# Patient Record
Sex: FEMALE | Race: OTHER | HISPANIC OR LATINO | URBAN - METROPOLITAN AREA
[De-identification: names, ages, dates, MRNs, and addresses within clinical notes are randomized per-mention and may not be internally consistent; named-entity substitution may affect disease eponyms.]

---

## 2022-11-27 ENCOUNTER — EMERGENCY (EMERGENCY)
Facility: HOSPITAL | Age: 52
LOS: 0 days | Discharge: HOME | End: 2022-11-27
Attending: EMERGENCY MEDICINE | Admitting: EMERGENCY MEDICINE

## 2022-11-27 VITALS
HEART RATE: 90 BPM | SYSTOLIC BLOOD PRESSURE: 133 MMHG | DIASTOLIC BLOOD PRESSURE: 93 MMHG | OXYGEN SATURATION: 99 % | RESPIRATION RATE: 18 BRPM | TEMPERATURE: 99 F | WEIGHT: 139.99 LBS

## 2022-11-27 DIAGNOSIS — Z88.5 ALLERGY STATUS TO NARCOTIC AGENT: ICD-10-CM

## 2022-11-27 DIAGNOSIS — M54.42 LUMBAGO WITH SCIATICA, LEFT SIDE: ICD-10-CM

## 2022-11-27 DIAGNOSIS — M54.9 DORSALGIA, UNSPECIFIED: ICD-10-CM
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
## 2022-11-27 DIAGNOSIS — S63.602A UNSPECIFIED SPRAIN OF LEFT THUMB, INITIAL ENCOUNTER: ICD-10-CM

## 2022-11-27 DIAGNOSIS — M25.542 PAIN IN JOINTS OF LEFT HAND: ICD-10-CM

## 2022-11-27 DIAGNOSIS — S09.90XA UNSPECIFIED INJURY OF HEAD, INITIAL ENCOUNTER: ICD-10-CM

## 2022-11-27 DIAGNOSIS — M79.645 PAIN IN LEFT FINGER(S): ICD-10-CM

## 2022-11-27 DIAGNOSIS — S39.012A STRAIN OF MUSCLE, FASCIA AND TENDON OF LOWER BACK, INITIAL ENCOUNTER: ICD-10-CM
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
## 2022-11-27 DIAGNOSIS — C50.919 MALIGNANT NEOPLASM OF UNSPECIFIED SITE OF UNSPECIFIED FEMALE BREAST: ICD-10-CM

## 2022-11-27 DIAGNOSIS — Z88.8 ALLERGY STATUS TO OTHER DRUGS, MEDICAMENTS AND BIOLOGICAL SUBSTANCES: ICD-10-CM

## 2022-11-27 DIAGNOSIS — M54.2 CERVICALGIA: ICD-10-CM

## 2022-11-27 DIAGNOSIS — M54.41 LUMBAGO WITH SCIATICA, RIGHT SIDE: ICD-10-CM

## 2022-11-27 DIAGNOSIS — M54.12 RADICULOPATHY, CERVICAL REGION: ICD-10-CM

## 2022-11-27 DIAGNOSIS — G89.29 OTHER CHRONIC PAIN: ICD-10-CM

## 2022-11-27 PROCEDURE — 72100 X-RAY EXAM L-S SPINE 2/3 VWS: CPT | Mod: 26

## 2022-11-27 PROCEDURE — 99284 EMERGENCY DEPT VISIT MOD MDM: CPT

## 2022-11-27 PROCEDURE — 73130 X-RAY EXAM OF HAND: CPT | Mod: 26,LT

## 2022-11-27 RX ORDER — METHOCARBAMOL 500 MG/1
1000 TABLET, FILM COATED ORAL ONCE
Refills: 0 | Status: COMPLETED | OUTPATIENT
Start: 2022-11-27 | End: 2022-11-27

## 2022-11-27 RX ORDER — IBUPROFEN 200 MG
600 TABLET ORAL ONCE
Refills: 0 | Status: COMPLETED | OUTPATIENT
Start: 2022-11-27 | End: 2022-11-27

## 2022-11-27 RX ADMIN — METHOCARBAMOL 1000 MILLIGRAM(S): 500 TABLET, FILM COATED ORAL at 12:38

## 2022-11-27 RX ADMIN — Medication 600 MILLIGRAM(S): at 12:39
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
## 2022-11-27 NOTE — ED PROVIDER NOTE - ATTENDING APP SHARED VISIT CONTRIBUTION OF CARE
I personally evaluated the patient. I reviewed the Resident’s or Physician Assistant’s note (as assigned above), and agree with the findings and plan except as documented in my note.  52-year-old female history of breast cancer, chronic back and neck pain with bilateral cervical radiculopathy and sciatica (followed by pain management on methadone for pain and ewars TLSO brace), B/L CTS status post slip and fall on a wet floor in a hotel yesterday.  Positive head trauma, no LOC.  Patient complaining of left neck pain and back pain.  Patient reports she is ambulating well but with pain.  No new paresthesias or motor weakness.  No chest pain or shortness of breath.  No abdominal pain. Vitals noted. ALERT OX3 NAD GCS-15. NCAT. PERRL, EOMI. NO MIDLINE C SPINE TENDERNESS. + L PARASPINAL TENDERNESS. LUNGS CLEAR B/L. CHEST NONTENDER, NO CREPITUS. RRR. ABD- SOFT NONTENDER. PELVIS STABLE NONTENDER. BACK WITH THORACIC SPINE TENDERNESS AND B/L LUMBAR PARASPINAL TENDERNESS. NO STEPOFFS. NEURO EXAM NONFOCAL. NORMAL GAIT.

## 2022-11-27 NOTE — ED PROVIDER NOTE - NSFOLLOWUPINSTRUCTIONS_ED_ALL_ED_FT
Sprain    A sprain is a stretch or tear in one of the tough, fiber-like tissues (ligaments) in your body. This is caused by an injury to the area such as a twisting mechanism. Symptoms include pain, swelling, or bruising. Rest that area over the next several days and slowly resume activity when tolerated. Ice can help with swelling and pain.     SEEK IMMEDIATE MEDICAL CARE IF YOU HAVE ANY OF THE FOLLOWING SYMPTOMS: worsening pain, inability to move that body part, numbness or tingling.  Back Pain    Back pain is very common in adults. The cause of back pain is rarely dangerous and the pain often gets better over time. The cause of your back pain may not be known and may include strain of muscles or ligaments, degeneration of the spinal disks, or arthritis. Occasionally the pain may radiate down your leg(s). Over-the-counter medicines to reduce pain and inflammation are often the most helpful. Stretching and remaining active frequently helps the healing process.     SEEK IMMEDIATE MEDICAL CARE IF YOU HAVE ANY OF THE FOLLOWING SYMPTOMS: bowel or bladder control problems, unusual weakness or numbness in your arms or legs, nausea or vomiting, abdominal pain, fever, dizziness/lightheadedness.

## 2022-11-27 NOTE — ED ADULT TRIAGE NOTE - CHIEF COMPLAINT QUOTE
Patient c/o slip and fall on bathroom floor yesterday morning, c/o shoulder and back pain . Patient states she hit her head but did not loss consciousness. Denies AC.

## 2022-11-27 NOTE — ED PROVIDER NOTE - PHYSICAL EXAMINATION
CONST: Well appearing in NAD  EYES: PERRL, EOMI, Sclera and conjunctiva clear.   NECK: Non-tender, no meningeal signs  CARD: Normal S1 S2; Normal rate and rhythm  RESP: Equal BS B/L, No wheezes, rhonchi or rales. No distress  GI: Soft, non-tender, non-distended.  MS: Normal ROM in all extremities. No midline spinal tenderness. paraspinal tenderness upper neck and back and lower back. Left thumb is swollen and tender at base but FROM. NV intact distally  SKIN: Warm, dry, no acute rashes. Good turgor  NEURO: A&Ox3, No focal deficits. Strength 5/5 with no sensory deficits. Steady gait

## 2022-11-27 NOTE — ED PROVIDER NOTE - PATIENT PORTAL LINK FT
You can access the FollowMyHealth Patient Portal offered by [facility] by registering at the following website: [email or web address]. By joining [employer]’s FollowMyHealth portal, you will also be able to view your health information using other applications (apps) compatible with our system.
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
## 2022-11-28 NOTE — ED POST DISCHARGE NOTE - RESULT SUMMARY
LS SPINE XR-AGE INDETERMINATE T-12 AND L-1 COMPRESSION FX. VOICEMAIL IS NOT SAME AS PATIENT NAME. PERSON ON VOICEMAIL IS NOT NAMED AS A CONTACT.  UNABLE TO REACH BY PHONE. [employer] LETTER SENT. LS SPINE XR-AGE INDETERMINATE T-12 AND L-1 COMPRESSION FX. VOICEMAIL IS NOT SAME AS PATIENT NAME. PERSON ON VOICEMAIL IS NOT NAMED AS A CONTACT.  LEFT MESSAGE ON SISTERRANJANA'S PHONES

## 2023-08-28 PROBLEM — Z00.00 ENCOUNTER FOR PREVENTIVE HEALTH EXAMINATION: Status: ACTIVE | Noted: 2023-08-28

## 2023-08-29 ENCOUNTER — APPOINTMENT (OUTPATIENT)
Dept: PAIN MANAGEMENT | Facility: CLINIC | Age: 53
End: 2023-08-29
Payer: COMMERCIAL

## 2023-08-29 VITALS — BODY MASS INDEX: 30.36 KG/M2 | HEIGHT: 62 IN | WEIGHT: 165 LBS

## 2023-08-29 DIAGNOSIS — Z87.898 PERSONAL HISTORY OF OTHER SPECIFIED CONDITIONS: ICD-10-CM

## 2023-08-29 DIAGNOSIS — M54.16 RADICULOPATHY, LUMBAR REGION: ICD-10-CM

## 2023-08-29 DIAGNOSIS — Z85.3 PERSONAL HISTORY OF MALIGNANT NEOPLASM OF BREAST: ICD-10-CM

## 2023-08-29 DIAGNOSIS — F32.A ANXIETY DISORDER, UNSPECIFIED: ICD-10-CM

## 2023-08-29 DIAGNOSIS — M54.12 RADICULOPATHY, CERVICAL REGION: ICD-10-CM

## 2023-08-29 DIAGNOSIS — Z87.09 PERSONAL HISTORY OF OTHER DISEASES OF THE RESPIRATORY SYSTEM: ICD-10-CM

## 2023-08-29 DIAGNOSIS — Z87.39 PERSONAL HISTORY OF OTHER DISEASES OF THE MUSCULOSKELETAL SYSTEM AND CONNECTIVE TISSUE: ICD-10-CM

## 2023-08-29 DIAGNOSIS — Z86.39 PERSONAL HISTORY OF OTHER ENDOCRINE, NUTRITIONAL AND METABOLIC DISEASE: ICD-10-CM

## 2023-08-29 DIAGNOSIS — Z86.79 PERSONAL HISTORY OF OTHER DISEASES OF THE CIRCULATORY SYSTEM: ICD-10-CM

## 2023-08-29 DIAGNOSIS — F41.9 ANXIETY DISORDER, UNSPECIFIED: ICD-10-CM

## 2023-08-29 PROCEDURE — 99204 OFFICE O/P NEW MOD 45 MIN: CPT

## 2023-08-29 RX ORDER — LEVETIRACETAM 1000 MG/1
TABLET, FILM COATED ORAL
Refills: 0 | Status: ACTIVE | COMMUNITY

## 2023-08-29 RX ORDER — LISINOPRIL 30 MG/1
TABLET ORAL
Refills: 0 | Status: ACTIVE | COMMUNITY
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
## 2023-08-29 NOTE — PHYSICAL EXAM
[de-identified] : lumbar spine + seated slump right side + facet loading b/l  cervical spine + spurling's right side pain with extension, no pain with flexion weakness in finger flexors on right side - weeks's b/l [Fever] : no fever [Chills] : chills [Muscle Pain] : muscle pain [Suicidal] : not suicidal [Anxiety] : no anxiety [Depression] : no depression [Negative] : Heme/Lymph [FreeTextEntry3] : eye infection

## 2023-08-29 NOTE — DISCUSSION/SUMMARY
[de-identified] : Treatment options were discussed with the patient. The patient has been having persistent lower back and lumbar radicular pain with minimal improvement with conservative therapies. Given that the patient has severe pain and failed conservative treatment, the patient was given the option to proceed with a lumbar epidural steroid injection to try to get some pain relief.    The risks and benefits were discussed which included bleeding, infection, nerve injury, no pain relief or worse, increased pain. All questions were answered and concerns addressed. [id number] NO MAC  ordered mobic 15mg daily for 2 weeks. Discussed risks and benefits. Avoid taking for any side effects  f/u in 2 weeks s/p inj

## 2023-08-29 NOTE — ED PROVIDER NOTE - IV ALTEPLASE INCLUSION HIDDEN
show Clindamycin Pregnancy And Lactation Text: This medication can be used in pregnancy if certain situations. Clindamycin is also present in breast milk.

## 2024-07-29 ENCOUNTER — INPATIENT (INPATIENT)
Facility: HOSPITAL | Age: 54
LOS: 2 days | Discharge: HOME | DRG: 897 | End: 2024-08-01
Attending: STUDENT IN AN ORGANIZED HEALTH CARE EDUCATION/TRAINING PROGRAM | Admitting: HOSPITALIST
Payer: MEDICARE

## 2024-07-29 ENCOUNTER — EMERGENCY (EMERGENCY)
Facility: HOSPITAL | Age: 54
LOS: 0 days | Discharge: HOME | End: 2024-07-29
Attending: EMERGENCY MEDICINE
Payer: MEDICARE

## 2024-07-29 VITALS
OXYGEN SATURATION: 99 % | HEART RATE: 97 BPM | RESPIRATION RATE: 20 BRPM | SYSTOLIC BLOOD PRESSURE: 142 MMHG | DIASTOLIC BLOOD PRESSURE: 100 MMHG

## 2024-07-29 VITALS
WEIGHT: 199.96 LBS | HEIGHT: 63 IN | RESPIRATION RATE: 16 BRPM | DIASTOLIC BLOOD PRESSURE: 96 MMHG | OXYGEN SATURATION: 98 % | SYSTOLIC BLOOD PRESSURE: 173 MMHG | HEART RATE: 75 BPM | TEMPERATURE: 98 F

## 2024-07-29 VITALS
DIASTOLIC BLOOD PRESSURE: 100 MMHG | HEIGHT: 63 IN | WEIGHT: 199.96 LBS | HEART RATE: 96 BPM | SYSTOLIC BLOOD PRESSURE: 159 MMHG | OXYGEN SATURATION: 98 % | RESPIRATION RATE: 20 BRPM | TEMPERATURE: 98 F

## 2024-07-29 DIAGNOSIS — F11.20 OPIOID DEPENDENCE, UNCOMPLICATED: ICD-10-CM

## 2024-07-29 DIAGNOSIS — Z88.5 ALLERGY STATUS TO NARCOTIC AGENT: ICD-10-CM

## 2024-07-29 DIAGNOSIS — G40.909 EPILEPSY, UNSPECIFIED, NOT INTRACTABLE, WITHOUT STATUS EPILEPTICUS: ICD-10-CM

## 2024-07-29 DIAGNOSIS — I10 ESSENTIAL (PRIMARY) HYPERTENSION: ICD-10-CM

## 2024-07-29 DIAGNOSIS — J34.89 OTHER SPECIFIED DISORDERS OF NOSE AND NASAL SINUSES: ICD-10-CM

## 2024-07-29 DIAGNOSIS — R11.2 NAUSEA WITH VOMITING, UNSPECIFIED: ICD-10-CM

## 2024-07-29 LAB
ALBUMIN SERPL ELPH-MCNC: 4.8 G/DL — SIGNIFICANT CHANGE UP (ref 3.5–5.2)
ALP SERPL-CCNC: 102 U/L — SIGNIFICANT CHANGE UP (ref 30–115)
ALT FLD-CCNC: 26 U/L — SIGNIFICANT CHANGE UP (ref 0–41)
ANION GAP SERPL CALC-SCNC: 15 MMOL/L — HIGH (ref 7–14)
ANISOCYTOSIS BLD QL: SLIGHT — SIGNIFICANT CHANGE UP
AST SERPL-CCNC: 25 U/L — SIGNIFICANT CHANGE UP (ref 0–41)
BASOPHILS # BLD AUTO: 0.05 K/UL — SIGNIFICANT CHANGE UP (ref 0–0.2)
BASOPHILS NFR BLD AUTO: 0.4 % — SIGNIFICANT CHANGE UP (ref 0–1)
BILIRUB SERPL-MCNC: 0.3 MG/DL — SIGNIFICANT CHANGE UP (ref 0.2–1.2)
BUN SERPL-MCNC: 13 MG/DL — SIGNIFICANT CHANGE UP (ref 10–20)
CALCIUM SERPL-MCNC: 9.9 MG/DL — SIGNIFICANT CHANGE UP (ref 8.4–10.5)
CHLORIDE SERPL-SCNC: 96 MMOL/L — LOW (ref 98–110)
CO2 SERPL-SCNC: 26 MMOL/L — SIGNIFICANT CHANGE UP (ref 17–32)
CREAT SERPL-MCNC: 0.8 MG/DL — SIGNIFICANT CHANGE UP (ref 0.7–1.5)
DACRYOCYTES BLD QL SMEAR: SLIGHT — SIGNIFICANT CHANGE UP
EGFR: 88 ML/MIN/1.73M2 — SIGNIFICANT CHANGE UP
EOSINOPHIL # BLD AUTO: 0.01 K/UL — SIGNIFICANT CHANGE UP (ref 0–0.7)
EOSINOPHIL NFR BLD AUTO: 0.1 % — SIGNIFICANT CHANGE UP (ref 0–8)
GLUCOSE SERPL-MCNC: 176 MG/DL — HIGH (ref 70–99)
HCG SERPL QL: NEGATIVE — SIGNIFICANT CHANGE UP
HCT VFR BLD CALC: 40.9 % — SIGNIFICANT CHANGE UP (ref 37–47)
HGB BLD-MCNC: 13.4 G/DL — SIGNIFICANT CHANGE UP (ref 12–16)
IMM GRANULOCYTES NFR BLD AUTO: 0.4 % — HIGH (ref 0.1–0.3)
LYMPHOCYTES # BLD AUTO: 2.31 K/UL — SIGNIFICANT CHANGE UP (ref 1.2–3.4)
LYMPHOCYTES # BLD AUTO: 20.7 % — SIGNIFICANT CHANGE UP (ref 20.5–51.1)
MACROCYTES BLD QL: SLIGHT — SIGNIFICANT CHANGE UP
MANUAL SMEAR VERIFICATION: SIGNIFICANT CHANGE UP
MCHC RBC-ENTMCNC: 21.1 PG — LOW (ref 27–31)
MCHC RBC-ENTMCNC: 32.8 G/DL — SIGNIFICANT CHANGE UP (ref 32–37)
MCV RBC AUTO: 64.4 FL — LOW (ref 81–99)
MICROCYTES BLD QL: SLIGHT — SIGNIFICANT CHANGE UP
MONOCYTES # BLD AUTO: 0.35 K/UL — SIGNIFICANT CHANGE UP (ref 0.1–0.6)
MONOCYTES NFR BLD AUTO: 3.1 % — SIGNIFICANT CHANGE UP (ref 1.7–9.3)
NEUTROPHILS # BLD AUTO: 8.38 K/UL — HIGH (ref 1.4–6.5)
NEUTROPHILS NFR BLD AUTO: 75.3 % — HIGH (ref 42.2–75.2)
NRBC # BLD: 0 /100 WBCS — SIGNIFICANT CHANGE UP (ref 0–0)
OVALOCYTES BLD QL SMEAR: SLIGHT — SIGNIFICANT CHANGE UP
PLAT MORPH BLD: NORMAL — SIGNIFICANT CHANGE UP
PLATELET # BLD AUTO: 238 K/UL — SIGNIFICANT CHANGE UP (ref 130–400)
PLATELET CLUMP BLD QL SMEAR: SIGNIFICANT CHANGE UP
PLATELET COUNT - ESTIMATE: NORMAL — SIGNIFICANT CHANGE UP
PMV BLD: 10.1 FL — SIGNIFICANT CHANGE UP (ref 7.4–10.4)
POTASSIUM SERPL-MCNC: 3.6 MMOL/L — SIGNIFICANT CHANGE UP (ref 3.5–5)
POTASSIUM SERPL-SCNC: 3.6 MMOL/L — SIGNIFICANT CHANGE UP (ref 3.5–5)
PROT SERPL-MCNC: 8.2 G/DL — HIGH (ref 6–8)
RBC # BLD: 6.35 M/UL — HIGH (ref 4.2–5.4)
RBC # FLD: 15.1 % — HIGH (ref 11.5–14.5)
RBC BLD AUTO: ABNORMAL
SODIUM SERPL-SCNC: 137 MMOL/L — SIGNIFICANT CHANGE UP (ref 135–146)
WBC # BLD: 11.14 K/UL — HIGH (ref 4.8–10.8)
WBC # FLD AUTO: 11.14 K/UL — HIGH (ref 4.8–10.8)

## 2024-07-29 PROCEDURE — 85025 COMPLETE CBC W/AUTO DIFF WBC: CPT

## 2024-07-29 PROCEDURE — 80307 DRUG TEST PRSMV CHEM ANLYZR: CPT

## 2024-07-29 PROCEDURE — 99285 EMERGENCY DEPT VISIT HI MDM: CPT

## 2024-07-29 PROCEDURE — 36415 COLL VENOUS BLD VENIPUNCTURE: CPT

## 2024-07-29 PROCEDURE — 80053 COMPREHEN METABOLIC PANEL: CPT

## 2024-07-29 PROCEDURE — 96375 TX/PRO/DX INJ NEW DRUG ADDON: CPT

## 2024-07-29 PROCEDURE — 93010 ELECTROCARDIOGRAM REPORT: CPT

## 2024-07-29 PROCEDURE — 93005 ELECTROCARDIOGRAM TRACING: CPT

## 2024-07-29 PROCEDURE — 99284 EMERGENCY DEPT VISIT MOD MDM: CPT

## 2024-07-29 PROCEDURE — 85027 COMPLETE CBC AUTOMATED: CPT

## 2024-07-29 PROCEDURE — 99053 MED SERV 10PM-8AM 24 HR FAC: CPT

## 2024-07-29 PROCEDURE — 99284 EMERGENCY DEPT VISIT MOD MDM: CPT | Mod: 25

## 2024-07-29 PROCEDURE — 80354 DRUG SCREENING FENTANYL: CPT

## 2024-07-29 PROCEDURE — 84703 CHORIONIC GONADOTROPIN ASSAY: CPT

## 2024-07-29 PROCEDURE — 83735 ASSAY OF MAGNESIUM: CPT

## 2024-07-29 PROCEDURE — 80048 BASIC METABOLIC PNL TOTAL CA: CPT

## 2024-07-29 PROCEDURE — 96374 THER/PROPH/DIAG INJ IV PUSH: CPT

## 2024-07-29 RX ORDER — ONDANSETRON HCL/PF 4 MG/2 ML
4 VIAL (ML) INJECTION EVERY 8 HOURS
Refills: 0 | Status: DISCONTINUED | OUTPATIENT
Start: 2024-07-29 | End: 2024-07-31

## 2024-07-29 RX ORDER — PROCHLORPERAZINE MALEATE 10 MG/1
10 TABLET, FILM COATED ORAL ONCE
Refills: 0 | Status: COMPLETED | OUTPATIENT
Start: 2024-07-29 | End: 2024-07-29

## 2024-07-29 RX ORDER — BUPRENORPHINE HYDROCHLORIDE AND NALOXONE HYDROCHLORIDE 2; .5 MG/1; MG/1
2 TABLET SUBLINGUAL EVERY 4 HOURS
Refills: 0 | Status: DISCONTINUED | OUTPATIENT
Start: 2024-07-29 | End: 2024-08-01

## 2024-07-29 RX ORDER — CLONIDINE 500 UG/ML
0.2 INJECTION, SOLUTION EPIDURAL ONCE
Refills: 0 | Status: COMPLETED | OUTPATIENT
Start: 2024-07-29 | End: 2024-07-29

## 2024-07-29 RX ORDER — BUPRENORPHINE AND NALOXONE 12; 3 MG/1; MG/1
1 FILM BUCCAL; SUBLINGUAL ONCE
Refills: 0 | Status: DISCONTINUED | OUTPATIENT
Start: 2024-07-29 | End: 2024-07-29

## 2024-07-29 RX ORDER — MAGNESIUM, ALUMINUM HYDROXIDE 200-225/5
30 SUSPENSION, ORAL (FINAL DOSE FORM) ORAL EVERY 4 HOURS
Refills: 0 | Status: DISCONTINUED | OUTPATIENT
Start: 2024-07-29 | End: 2024-08-01

## 2024-07-29 RX ORDER — KETOROLAC TROMETHAMINE 30 MG/ML
15 INJECTION, SOLUTION INTRAMUSCULAR ONCE
Refills: 0 | Status: DISCONTINUED | OUTPATIENT
Start: 2024-07-29 | End: 2024-07-29

## 2024-07-29 RX ORDER — RISPERIDONE 1 MG/1
2 TABLET, FILM COATED ORAL DAILY
Refills: 0 | Status: DISCONTINUED | OUTPATIENT
Start: 2024-07-29 | End: 2024-08-01

## 2024-07-29 RX ORDER — ACETAMINOPHEN 500 MG
650 TABLET ORAL EVERY 6 HOURS
Refills: 0 | Status: DISCONTINUED | OUTPATIENT
Start: 2024-07-29 | End: 2024-08-01

## 2024-07-29 RX ORDER — DEXTROSE MONOHYDRATE, SODIUM CHLORIDE, SODIUM LACTATE, CALCIUM CHLORIDE, MAGNESIUM CHLORIDE 1.5; 538; 448; 18.4; 5.08 G/100ML; MG/100ML; MG/100ML; MG/100ML; MG/100ML
1000 SOLUTION INTRAPERITONEAL ONCE
Refills: 0 | Status: COMPLETED | OUTPATIENT
Start: 2024-07-29 | End: 2024-07-29

## 2024-07-29 RX ORDER — VENLAFAXINE 25 MG/1
1 TABLET ORAL
Refills: 0 | DISCHARGE

## 2024-07-29 RX ORDER — RISPERIDONE 1 MG/1
1 TABLET, FILM COATED ORAL
Refills: 0 | DISCHARGE

## 2024-07-29 RX ORDER — BUPRENORPHINE HYDROCHLORIDE AND NALOXONE HYDROCHLORIDE 2; .5 MG/1; MG/1
2 TABLET SUBLINGUAL ONCE
Refills: 0 | Status: DISCONTINUED | OUTPATIENT
Start: 2024-07-29 | End: 2024-07-29

## 2024-07-29 RX ORDER — ONDANSETRON HCL/PF 4 MG/2 ML
4 VIAL (ML) INJECTION ONCE
Refills: 0 | Status: COMPLETED | OUTPATIENT
Start: 2024-07-29 | End: 2024-07-29

## 2024-07-29 RX ORDER — LEVETIRACETAM 1000 MG/1
1 TABLET, FILM COATED ORAL
Refills: 0 | DISCHARGE

## 2024-07-29 RX ORDER — BACTERIOSTATIC SODIUM CHLORIDE 0.9 %
1000 VIAL (ML) INJECTION
Refills: 0 | Status: DISCONTINUED | OUTPATIENT
Start: 2024-07-29 | End: 2024-07-29

## 2024-07-29 RX ORDER — LISINOPRIL/HYDROCHLOROTHIAZIDE 20-12.5 MG
1 TABLET ORAL
Refills: 0 | DISCHARGE

## 2024-07-29 RX ORDER — ONDANSETRON HYDROCHLORIDE 2 MG/ML
8 INJECTION INTRAMUSCULAR; INTRAVENOUS ONCE
Refills: 0 | Status: COMPLETED | OUTPATIENT
Start: 2024-07-29 | End: 2024-07-29

## 2024-07-29 RX ORDER — LORAZEPAM 1 MG/1
2 TABLET ORAL ONCE
Refills: 0 | Status: DISCONTINUED | OUTPATIENT
Start: 2024-07-29 | End: 2024-08-01

## 2024-07-29 RX ORDER — VENLAFAXINE 25 MG/1
75 TABLET ORAL DAILY
Refills: 0 | Status: DISCONTINUED | OUTPATIENT
Start: 2024-07-29 | End: 2024-08-01

## 2024-07-29 RX ORDER — ONDANSETRON HYDROCHLORIDE 2 MG/ML
4 INJECTION INTRAMUSCULAR; INTRAVENOUS ONCE
Refills: 0 | Status: COMPLETED | OUTPATIENT
Start: 2024-07-29 | End: 2024-07-29

## 2024-07-29 RX ORDER — HYDROXYZINE HCL 50 MG/ML
25 VIAL (ML) INTRAMUSCULAR DAILY
Refills: 0 | Status: DISCONTINUED | OUTPATIENT
Start: 2024-07-29 | End: 2024-08-01

## 2024-07-29 RX ORDER — LEVETIRACETAM 1000 MG/1
1000 TABLET, FILM COATED ORAL
Refills: 0 | Status: DISCONTINUED | OUTPATIENT
Start: 2024-07-29 | End: 2024-08-01

## 2024-07-29 RX ORDER — LOPERAMIDE HYDROCHLORIDE 2 MG/1
2 CAPSULE ORAL THREE TIMES A DAY
Refills: 0 | Status: DISCONTINUED | OUTPATIENT
Start: 2024-07-29 | End: 2024-08-01

## 2024-07-29 RX ORDER — LISINOPRIL 10 MG/1
20 TABLET ORAL DAILY
Refills: 0 | Status: DISCONTINUED | OUTPATIENT
Start: 2024-07-29 | End: 2024-08-01

## 2024-07-29 RX ORDER — BUPRENORPHINE AND NALOXONE 12; 3 MG/1; MG/1
2 FILM BUCCAL; SUBLINGUAL ONCE
Refills: 0 | Status: DISCONTINUED | OUTPATIENT
Start: 2024-07-29 | End: 2024-07-29

## 2024-07-29 RX ORDER — DEXTROSE MONOHYDRATE, SODIUM CHLORIDE, SODIUM LACTATE, CALCIUM CHLORIDE, MAGNESIUM CHLORIDE 1.5; 538; 448; 18.4; 5.08 G/100ML; MG/100ML; MG/100ML; MG/100ML; MG/100ML
1000 SOLUTION INTRAPERITONEAL
Refills: 0 | Status: DISCONTINUED | OUTPATIENT
Start: 2024-07-29 | End: 2024-07-30

## 2024-07-29 RX ORDER — DEXTROSE MONOHYDRATE AND SODIUM CHLORIDE 5; .3 G/100ML; G/100ML
1000 INJECTION, SOLUTION INTRAVENOUS ONCE
Refills: 0 | Status: COMPLETED | OUTPATIENT
Start: 2024-07-29 | End: 2024-07-29

## 2024-07-29 RX ORDER — ENOXAPARIN SODIUM 120 MG/.8ML
40 INJECTION SUBCUTANEOUS EVERY 24 HOURS
Refills: 0 | Status: DISCONTINUED | OUTPATIENT
Start: 2024-07-29 | End: 2024-08-01

## 2024-07-29 RX ADMIN — DEXTROSE MONOHYDRATE, SODIUM CHLORIDE, SODIUM LACTATE, CALCIUM CHLORIDE, MAGNESIUM CHLORIDE 1000 MILLILITER(S): 1.5; 538; 448; 18.4; 5.08 SOLUTION INTRAPERITONEAL at 12:55

## 2024-07-29 RX ADMIN — BUPRENORPHINE AND NALOXONE 2 TABLET(S): 12; 3 FILM BUCCAL; SUBLINGUAL at 07:22

## 2024-07-29 RX ADMIN — CLONIDINE 0.2 MILLIGRAM(S): 500 INJECTION, SOLUTION EPIDURAL at 10:44

## 2024-07-29 RX ADMIN — LEVETIRACETAM 1000 MILLIGRAM(S): 1000 TABLET, FILM COATED ORAL at 17:47

## 2024-07-29 RX ADMIN — KETOROLAC TROMETHAMINE 15 MILLIGRAM(S): 30 INJECTION, SOLUTION INTRAMUSCULAR at 06:49

## 2024-07-29 RX ADMIN — ONDANSETRON HYDROCHLORIDE 4 MILLIGRAM(S): 2 INJECTION INTRAMUSCULAR; INTRAVENOUS at 06:49

## 2024-07-29 RX ADMIN — ONDANSETRON HYDROCHLORIDE 8 MILLIGRAM(S): 2 INJECTION INTRAMUSCULAR; INTRAVENOUS at 05:17

## 2024-07-29 RX ADMIN — Medication 25 MILLIGRAM(S): at 23:33

## 2024-07-29 RX ADMIN — PROCHLORPERAZINE MALEATE 10 MILLIGRAM(S): 10 TABLET, FILM COATED ORAL at 18:25

## 2024-07-29 RX ADMIN — BUPRENORPHINE AND NALOXONE 1 TABLET(S): 12; 3 FILM BUCCAL; SUBLINGUAL at 05:27

## 2024-07-29 RX ADMIN — DEXTROSE MONOHYDRATE, SODIUM CHLORIDE, SODIUM LACTATE, CALCIUM CHLORIDE, MAGNESIUM CHLORIDE 75 MILLILITER(S): 1.5; 538; 448; 18.4; 5.08 SOLUTION INTRAPERITONEAL at 17:48

## 2024-07-29 RX ADMIN — DEXTROSE MONOHYDRATE AND SODIUM CHLORIDE 1000 MILLILITER(S): 5; .3 INJECTION, SOLUTION INTRAVENOUS at 06:48

## 2024-07-29 RX ADMIN — Medication 4 MILLIGRAM(S): at 18:27

## 2024-07-29 RX ADMIN — BUPRENORPHINE AND NALOXONE 1 TABLET(S): 12; 3 FILM BUCCAL; SUBLINGUAL at 05:57

## 2024-07-29 RX ADMIN — BUPRENORPHINE HYDROCHLORIDE AND NALOXONE HYDROCHLORIDE 2 TABLET(S): 2; .5 TABLET SUBLINGUAL at 10:44

## 2024-07-29 NOTE — H&P ADULT - NSHPPHYSICALEXAM_GEN_ALL_CORE
Vital Signs Last 24 Hrs  T(C): 36.7 (29 Jul 2024 11:53), Max: 36.8 (29 Jul 2024 07:26)  T(F): 98 (29 Jul 2024 11:53), Max: 98.2 (29 Jul 2024 07:26)  HR: 75 (29 Jul 2024 11:53) (62 - 97)  BP: 150/83 (29 Jul 2024 11:53) (142/100 - 173/96)  RR: 16 (29 Jul 2024 10:48) (16 - 20)  SpO2: 98% (29 Jul 2024 10:48) (98% - 99%)    Parameters below as of 29 Jul 2024 10:48  Patient On (Oxygen Delivery Method): room air      GENERAL:  54y/o Female, anxious, agitated.  HEAD:  Atraumatic, Normocephalic  EYES: EOMI, PERRLA, conjunctiva and sclera clear  MOUTH: + tongue fasiculations   CHEST/LUNG: Clear to auscultation bilaterally; No wheeze, rhonchi, or rales  HEART: Regular rate and rhythm; S1&S2  ABDOMEN: Soft, Nontender, Nondistended x 4 quadrants; Bowel sounds present  EXTREMITIES:   Peripheral Pulses Present, No clubbing, no cyanosis, or no edema, no calf tenderness  PSYCH: AAOx3, cooperative, appropriate  NEUROLOGY: b/l  UE resting tremors, sensation intact   SKIN: WNL

## 2024-07-29 NOTE — H&P ADULT - NSICDXPASTMEDICALHX_GEN_ALL_CORE_FT
PAST MEDICAL HISTORY:  Anxiety and depression     HLD (hyperlipidemia)     HTN (hypertension)     Seizure

## 2024-07-29 NOTE — ED PROVIDER NOTE - NSFOLLOWUPINSTRUCTIONS_ED_ALL_ED_FT
Opioid Use Disorder    Opioid use disorder is a mental disorder. It is the continued nonmedical use of opioids in spite of risks to health and well-being. Misused opioids include the street drug heroin. They also include pain medicines such as morphine, hydrocodone, oxycodone, and fentanyl. Opioids are very addictive. People who misuse opioids get an exaggerated feeling of well-being. Opioid use disorder often disrupts activities at home, work, or school. It may cause mental or physical problems.     A family history of opioid use disorder puts you at higher risk of it. People with opioid use disorder often misuse other drugs or have mental illness such as depression, posttraumatic stress disorder, or antisocial personality disorder. They also are at risk of suicide and death from overdose.    SIGNS AND SYMPTOMS  Signs and symptoms of opioid use disorder include:    Use of opioids in larger amounts or over a longer period than intended.   Unsuccessful attempts to cut down or control opioid use.   A lot of time spent obtaining, using, or recovering from the effects of opioids.   A strong desire or urge to use opioids (craving).   Continued use of opioids in spite of major problems at work, school, or home because of use.   Continued use of opioids in spite of relationship problems because of use.   Giving up or cutting down on important life activities because of opioid use.  Use of opioids over and over in situations when it is physically hazardous, such as driving a car.   Continued use of opioids in spite of a physical problem that is likely related to use. Physical problems can include:   Severe constipation.  Poor nutrition.  Infertility.  Tuberculosis.  Aspiration pneumonia.  Infections such as human immunodeficiency virus (HIV) and hepatitis (from injecting opioids).   Continued use of opioids in spite of a mental problem that is likely related to use. Mental problems can include:  Depression.  Anxiety.  Hallucinations.  Sleep problems.  Loss of sexual function.  Need to use more and more opioids to get the same effect, or lessened effect over time with use of the same amount (tolerance).   Having withdrawal symptoms when opioid use is stopped, or using opioids to reduce or avoid withdrawal symptoms. Withdrawal symptoms include:   Depressed, anxious, or irritable mood.   Nausea, vomiting, diarrhea, or intestinal cramping.   Muscle aches or spasms.   Excessive tearing or runny nose.   Dilated pupils, sweating, or hairs standing on end.  Yawning.  Fever, raised blood pressure, or fast pulse.   Restlessness or trouble sleeping. This does not apply to people taking opioids for medical reasons only.    DIAGNOSIS  Opioid use disorder is diagnosed by your health care provider. You may be asked questions about your opioid use and and how it affects your life. A physical exam may be done. A drug screen may be ordered. You may be referred to a mental health professional. The diagnosis of opioid use disorder requires at least two symptoms within 12 months. The type of opioid use disorder you have depends on the number of signs and symptoms you have. The type may be:    Mild. Two or three signs and symptoms.     Moderate. Four or five signs and symptoms.    Severe. Six or more signs and symptoms.     TREATMENT  Treatment is usually provided by mental health professionals with training in substance use disorders. The following options are available:    Detoxification. This is the first step in treatment for withdrawal. It is medically supervised withdrawal with the use of medicines. These medicines lessen withdrawal symptoms. They also raise the chance of becoming opioid free.   Counseling, also known as talk therapy. Talk therapy addresses the reasons you use opioids. It also addresses ways to keep you from using again (relapse). The goals of talk therapy are to avoid relapse by:   Identifying and avoiding triggers for use.  Finding healthy ways to cope with stress.  Learning how to handle cravings.  Support groups. Support groups provide emotional support, advice, and guidance.  A medicine that blocks opioid receptors in your brain. This medicine can reduce opioid cravings that lead to relapse. This medicine also blocks the desired opioid effect when relapse occurs.  Opioids that are taken by mouth in place of the misused opioid (opioid maintenance treatment). These medicines satisfy cravings but are safer than commonly misused opioids. This often is the best option for people who continue to relapse with other treatments.    HOME CARE INSTRUCTIONS  Take medicines only as directed by your health care provider.   Check with your health care provider before starting new medicines.  Keep all follow-up visits as directed by your health care provider.    SEEK MEDICAL CARE IF:  You are not able to take your medicines as directed.  Your symptoms get worse.    SEEK IMMEDIATE MEDICAL CARE IF:  You have serious thoughts about hurting yourself or others.  You may have taken an overdose of opioids.    FOR MORE INFORMATION  [place] on Drug Abuse: www.drugabuse.gov  Substance Abuse and Mental Health Services Administration: www.samhsa.gov    ADDITIONAL NOTES AND INSTRUCTIONS    Please follow up with your Primary MD in 24-48 hr.  Seek immediate medical care for any new/worsening signs or symptoms.

## 2024-07-29 NOTE — H&P ADULT - NSHPLABSRESULTS_GEN_ALL_CORE
13.4   11.14 )-----------( 238      ( 29 Jul 2024 06:51 )             40.9       07-29    137  |  96<L>  |  13  ----------------------------<  176<H>  3.6   |  26  |  0.8    Ca    9.9      29 Jul 2024 06:51    TPro  8.2<H>  /  Alb  4.8  /  TBili  0.3  /  DBili  x   /  AST  25  /  ALT  26  /  AlkPhos  102  07-29              Urinalysis Basic - ( 29 Jul 2024 06:51 )    Color: x / Appearance: x / SG: x / pH: x  Gluc: 176 mg/dL / Ketone: x  / Bili: x / Urobili: x   Blood: x / Protein: x / Nitrite: x   Leuk Esterase: x / RBC: x / WBC x   Sq Epi: x / Non Sq Epi: x / Bacteria: x

## 2024-07-29 NOTE — ED ADULT NURSE NOTE - NSFALLUNIVINTERV_ED_ALL_ED
Bed/Stretcher in lowest position, wheels locked, appropriate side rails in place/Call bell, personal items and telephone in reach/Instruct patient to call for assistance before getting out of bed/chair/stretcher/Non-slip footwear applied when patient is off stretcher/[place] to call system/Physically safe environment - no spills, clutter or unnecessary equipment/Purposeful proactive rounding/Room/bathroom lighting operational, light cord in reach

## 2024-07-29 NOTE — ED PROVIDER NOTE - ATTENDING APP SHARED VISIT CONTRIBUTION OF CARE
53-year-old female, history of heroin abuse, seizure disorder, states she is trying to get off heroin, complains of vomiting, diarrhea, chills and sweating.  Last heroin use was 4 days ago.  Exam shows alert restless patient in no distress, HEENT NCAT PERRL, rhinorrhea, lungs clear, RR S1S2, abdomen soft NT +BS, no CCE, +fine tremors.

## 2024-07-29 NOTE — H&P ADULT - ASSESSMENT
Patient is a 54yo female w/ pmhx of HTN, HLD, Seizure disorder, CORY/Depression, breast cancer s/p right mastectomy, Opioid abuse presenting w/ f n/v/d, tremors, anxiety, chills being admitted for opioid withdrawal.    #Opiate withdrawal   - COWS score: 16  - s/p Suboxone 8mg/2mg x4, Clonidine .2mg X1 in ED   - IVF LR @ 75ml./hr  - c/w Zofran prn for nausea, AM ecg, Monitor QT interval  - Urine Drug screen  - Addiction Medicine consult     #HTN - c/w home Lisinopril 20mg QD and HCTZ 25mg QD, monitor BP    #HLD - c/w Lipitor 10mg QD    #CORY/Depression: c/w risperidone 3mg QD and Venlafaxine ER 75mg QD    #Seizure disorder - c/w Keppra 1000mg BID, Seizure precaution Ativan 2mg IV Push prn    Diet: DASH  Activity: AAT   VTE PPX: Lovenox   GI PPX: PPI  Dispo: from home, detox, addiction medicine follow up     Plan Discussed and approved by attending on call. Patient is a 52yo female w/ pmhx of HTN, HLD, Seizure disorder, CORY/Depression, breast cancer s/p right mastectomy, Opioid abuse presenting w/ f n/v/d, tremors, anxiety, chills being admitted for opioid withdrawal.    #Opiate withdrawal   - COWS score: 16  - s/p Suboxone 8mg/2mg x4, Clonidine .2mg X1 in ED   - IVF LR @ 75ml./hr  - c/w Suboxone 4mg Q4 prn w/ max dose of 16mg/day    - c/w Zofran prn for nausea, AM ecg, Monitor QT interval  - Urine Drug screen  - Addiction Medicine consult     #HTN - c/w home Lisinopril 20mg QD and HCTZ 25mg QD, monitor BP    #HLD - c/w Lipitor 10mg QD    #CORY/Depression: c/w risperidone 3mg QD and Venlafaxine ER 75mg QD    #Seizure disorder - c/w Keppra 1000mg BID, Seizure precaution Ativan 2mg IV Push prn    Diet: DASH  Activity: AAT   VTE PPX: Lovenox   GI PPX: PPI  Dispo: from home, detox, addiction medicine follow up     Plan Discussed and approved by attending on call. Patient is a 54yo female w/ pmhx of HTN, HLD, Seizure disorder, CORY/Depression, breast cancer s/p right mastectomy, Opioid abuse presenting w/ f n/v/d, tremors, anxiety, chills being admitted for opioid withdrawal.    #Opiate withdrawal   - COWS score: 16  - s/p Suboxone 8mg/2mg x4, Clonidine .2mg X1 in ED   - IVF LR @ 75ml./hr  - c/w Suboxone 4mg Q4 prn w/ max dose of 16mg/day    - c/w Zofran prn for nausea, AM ecg, Monitor QT interval  - Hydroxyzine 25mg QD prn for anxiety   - Loperamide 2mg Q8 prn for diarrhea   - Urine Drug screen  - Addiction Medicine consult     #HTN - c/w home Lisinopril 20mg QD and HCTZ 25mg QD, monitor BP    #HLD - c/w Lipitor 10mg QD    #CORY/Depression: c/w risperidone 3mg QD and Venlafaxine ER 75mg QD    #Seizure disorder - c/w Keppra 1000mg BID, Seizure precaution Ativan 2mg IV Push prn    Diet: DASH  Activity: AAT   VTE PPX: Lovenox   GI PPX: PPI  Dispo: from home, detox, addiction medicine follow up     Plan Discussed and approved by attending on call.

## 2024-07-29 NOTE — ED PROVIDER NOTE - OBJECTIVE STATEMENT
53-year-old female past medical history of opiate dependency, hypertension, seizure disorder presents emergency department for opiate withdrawal.  Patient was in ER earlier went to methadone clinic was too symptomatic to receive methadone so presents back for nausea vomiting diarrhea abdominal cramping joint pain runny nose and anxiety

## 2024-07-29 NOTE — ED PROVIDER NOTE - ATTENDING APP SHARED VISIT CONTRIBUTION OF CARE
Patient with persisting GI symptoms after stopping opiates.  Symptoms are refractory to ED treatment over 2 visits.  Case discussed with toxicology.  Will admit for IV fluid, IV antiemetics, continued treatment with Suboxone versus methadone.

## 2024-07-29 NOTE — H&P ADULT - HISTORY OF PRESENT ILLNESS
Patient is a 54yo female w/ pmhx of HTN, HLD, Seizure disorder, CORY/Depression, breast cancer s/p right mastectomy, Opioid abuse presenting w/ opioid withdrawal sxs. Patient came in this morning w/ complaints of n/v/d, tremors, anxiety, chills, was given 16/4mg of Suboxone and was told to go to the methadone clinic. Reports saw the physician at methadone and was told that her sxs are acute and to return to the ER. Patient reports last using heroin on Thursday night. Reports since has been having chills, joint pain, tremors, n/v/d, anxiety prompting return to the ED. In the ED was given Suboxone 16mg again and .2mg of clonidine. Denies fever, chest pain, palpitation, dizziness, seizures, urinary sxs.

## 2024-07-29 NOTE — PATIENT PROFILE ADULT - FALL HARM RISK - HARM RISK INTERVENTIONS
Assistance with ambulation/Assistance OOB with selected safe patient handling equipment/Communicate Risk of Fall with Harm to all staff/Discuss with provider need for PT consult/Monitor gait and stability/Reinforce activity limits and safety measures with patient and family/Tailored Fall Risk Interventions/Visual Cue: Yellow wristband and red socks/Bed in lowest position, wheels locked, appropriate side rails in place/Call bell, personal items and telephone in reach/Instruct patient to call for assistance before getting out of bed or chair/Non-slip footwear when patient is out of bed/[place] to call system/Physically safe environment - no spills, clutter or unnecessary equipment/Purposeful Proactive Rounding/Room/bathroom lighting operational, light cord in reach

## 2024-07-29 NOTE — ED PROVIDER NOTE - CLINICAL SUMMARY MEDICAL DECISION MAKING FREE TEXT BOX
Patient feels somewhat better after ED treatment.  Patient strongly prefers to be assessed in the methadone clinic as soon as possible.  In my opinion, she is stable for that assessment at this time.

## 2024-07-29 NOTE — ED ADULT TRIAGE NOTE - CHIEF COMPLAINT QUOTE
Patient states she was here earlier for withdrawals and dc'd. Sent back by methadone clinic for w/d management.

## 2024-07-29 NOTE — ED PROVIDER NOTE - PHYSICAL EXAMINATION
CONSTITUTIONAL: Well-appearing;  in no apparent distress.   EYES: PERRL; EOM intact.  Pupils 3 mm bilaterally  ENT: + Rhinorrhea/moist oral mucosa  CARDIOVASCULAR: Normal S1, S2; no murmurs, rubs, or gallops.   RESPIRATORY: Normal chest excursion with respiration; breath sounds clear and equal bilaterally; no wheezes, rhonchi, or rales.  GI/: Normal bowel sounds; non-distended; non-tender; no palpable organomegaly.   MS: No deformity to all extremities  SKIN: Pearly rash.   NEURO/PSYCH: A & O x 4; grossly unremarkable.  Anxious mood

## 2024-07-29 NOTE — ED PROVIDER NOTE - PROGRESS NOTE DETAILS
sxs not improved after taking 16mg suboxone. still not feeling well and vomiting. will add on lab and iv meds and reassess. Signed out to Dr. Ngo. Patient advised follow-up methadone clinic

## 2024-07-29 NOTE — ED PROVIDER NOTE - PATIENT PORTAL LINK FT
You can access the FollowMyHealth Patient Portal offered by [facility] by registering at the following website: [email or web address]. By joining [employer]s FollowMyHealth portal, you will also be able to view your health information using other applications (apps) compatible with our system.

## 2024-07-29 NOTE — ED PROVIDER NOTE - PROGRESS NOTE DETAILS
COWS at 1050 17, Repeated COWS now 14 patient still complaining of symptoms spoke with addiction medicine and inpatient will admit

## 2024-07-29 NOTE — ED PROVIDER NOTE - NSFOLLOWUPCLINICS_GEN_ALL_ED_FT
[facility]  Detox Mgmt  [address] [place] [address]  Phone: [phone]  Fax:   Follow Up Time: 1-3 Days

## 2024-07-29 NOTE — ED PROVIDER NOTE - OBJECTIVE STATEMENT
53 years old female history of hypertension, seizure disorder, opioid abuse presents complaints of opioid withdrawal symptoms.  Admitted last time she used heroin was about 5 days ago.  Reports she has been having nausea, vomiting, loose bowel, feeling anxious, chills, runny nose over the past 5 days.  Not able to tolerate above symptoms anymore so she comes to ED for evaluation.  Otherwise denies recent illness, fever, chills, chest pain, abdominal pain urinary symptoms.

## 2024-07-29 NOTE — H&P ADULT - NS ATTEND AMEND GEN_ALL_CORE FT
Patient is a 54yo female w/ pmhx of HTN, HLD, Seizure disorder, CORY/Depression, breast cancer s/p right mastectomy, Opioid abuse presenting w/ f n/v/d, tremors, anxiety, chills being admitted for opioid withdrawal.  Pt expressed she wanted to quit abusing opioids. Pt currently c/o nausea and vomiting. Discussed with Addiction Medicine team, plan to start suboxone, IV fluids, anti-emetics and supportive medications.    Agree with plan as above.

## 2024-07-29 NOTE — H&P ADULT - NSHPSOCIALHISTORY_GEN_ALL_CORE
Patient reports hx of smoking 1/2 PPD x20 years. Reports heroin use for past 6 months, last dose 5 days ago  Denies ETOH use

## 2024-07-29 NOTE — PATIENT PROFILE ADULT - NSPROPTRIGHTBILLOFRIGHTS_GEN_A_NUR
reoperation, cervical stenosis,  cervical incompetence, intrauterine adhesions; and she agrees to  proceed.         Stephania Kumar MD    D: 02/27/2020 9:55:14       T: 02/27/2020 13:17:03     [email or web address]  Job#: [id number]     Doc#: [id number]    CC: patient

## 2024-07-30 DIAGNOSIS — F11.20 OPIOID DEPENDENCE, UNCOMPLICATED: ICD-10-CM

## 2024-07-30 LAB
ANION GAP SERPL CALC-SCNC: 21 MMOL/L — HIGH (ref 7–14)
BUN SERPL-MCNC: 15 MG/DL — SIGNIFICANT CHANGE UP (ref 10–20)
CALCIUM SERPL-MCNC: 9.5 MG/DL — SIGNIFICANT CHANGE UP (ref 8.4–10.5)
CHLORIDE SERPL-SCNC: 97 MMOL/L — LOW (ref 98–110)
CO2 SERPL-SCNC: 22 MMOL/L — SIGNIFICANT CHANGE UP (ref 17–32)
CREAT SERPL-MCNC: 0.7 MG/DL — SIGNIFICANT CHANGE UP (ref 0.7–1.5)
EGFR: 103 ML/MIN/1.73M2 — SIGNIFICANT CHANGE UP
GLUCOSE SERPL-MCNC: 142 MG/DL — HIGH (ref 70–99)
HCT VFR BLD CALC: 43.4 % — SIGNIFICANT CHANGE UP (ref 37–47)
HGB BLD-MCNC: 13.9 G/DL — SIGNIFICANT CHANGE UP (ref 12–16)
MCHC RBC-ENTMCNC: 20.8 PG — LOW (ref 27–31)
MCHC RBC-ENTMCNC: 32 G/DL — SIGNIFICANT CHANGE UP (ref 32–37)
MCV RBC AUTO: 65 FL — LOW (ref 81–99)
NRBC # BLD: 0 /100 WBCS — SIGNIFICANT CHANGE UP (ref 0–0)
PLATELET # BLD AUTO: 247 K/UL — SIGNIFICANT CHANGE UP (ref 130–400)
PMV BLD: SIGNIFICANT CHANGE UP (ref 7.4–10.4)
POTASSIUM SERPL-MCNC: 3.8 MMOL/L — SIGNIFICANT CHANGE UP (ref 3.5–5)
POTASSIUM SERPL-SCNC: 3.8 MMOL/L — SIGNIFICANT CHANGE UP (ref 3.5–5)
RBC # BLD: 6.68 M/UL — HIGH (ref 4.2–5.4)
RBC # FLD: 17 % — HIGH (ref 11.5–14.5)
SODIUM SERPL-SCNC: 140 MMOL/L — SIGNIFICANT CHANGE UP (ref 135–146)
WBC # BLD: 15.18 K/UL — HIGH (ref 4.8–10.8)
WBC # FLD AUTO: 15.18 K/UL — HIGH (ref 4.8–10.8)

## 2024-07-30 PROCEDURE — 99221 1ST HOSP IP/OBS SF/LOW 40: CPT

## 2024-07-30 PROCEDURE — 99232 SBSQ HOSP IP/OBS MODERATE 35: CPT

## 2024-07-30 RX ORDER — BUPRENORPHINE HYDROCHLORIDE AND NALOXONE HYDROCHLORIDE 2; .5 MG/1; MG/1
1 TABLET SUBLINGUAL
Refills: 0 | Status: DISCONTINUED | OUTPATIENT
Start: 2024-07-30 | End: 2024-08-01

## 2024-07-30 RX ADMIN — Medication 650 MILLIGRAM(S): at 14:04

## 2024-07-30 RX ADMIN — Medication 4 MILLIGRAM(S): at 03:44

## 2024-07-30 RX ADMIN — BUPRENORPHINE HYDROCHLORIDE AND NALOXONE HYDROCHLORIDE 1 TABLET(S): 2; .5 TABLET SUBLINGUAL at 17:06

## 2024-07-30 RX ADMIN — RISPERIDONE 2 MILLIGRAM(S): 1 TABLET, FILM COATED ORAL at 11:46

## 2024-07-30 RX ADMIN — LEVETIRACETAM 1000 MILLIGRAM(S): 1000 TABLET, FILM COATED ORAL at 05:15

## 2024-07-30 RX ADMIN — LEVETIRACETAM 1000 MILLIGRAM(S): 1000 TABLET, FILM COATED ORAL at 17:06

## 2024-07-30 RX ADMIN — LISINOPRIL 20 MILLIGRAM(S): 10 TABLET ORAL at 05:15

## 2024-07-30 RX ADMIN — ENOXAPARIN SODIUM 40 MILLIGRAM(S): 120 INJECTION SUBCUTANEOUS at 05:14

## 2024-07-30 RX ADMIN — VENLAFAXINE 75 MILLIGRAM(S): 25 TABLET ORAL at 11:46

## 2024-07-30 RX ADMIN — Medication 650 MILLIGRAM(S): at 14:17

## 2024-07-30 NOTE — PROGRESS NOTE ADULT - ASSESSMENT
54yo female w/ pmhx of HTN, HLD, Seizure disorder, CORY/Depression, breast cancer s/p right mastectomy, Opioid abuse presenting w/ f n/v/d, tremors, anxiety, chills being admitted for opioid withdrawal.    #Opiate withdrawal   - s/p Suboxone 8mg/2mg x4, Clonidine .2mg X1 in ED, initiating Suboxone per CATCH   - c/w Zofran prn for nausea, AM ecg, Monitor QT interval  - Hydroxyzine 25mg QD prn for anxiety   - Loperamide 2mg Q8 prn for diarrhea   - Urine Drug screen    #HTN - c/w home Lisinopril 20mg QD and HCTZ 25mg QD, monitor BP    #HLD - c/w Lipitor 10mg QD    #CORY/Depression: c/w risperidone 3mg QD and Venlafaxine ER 75mg QD    #Seizure disorder - c/w Keppra 1000mg BID, Seizure precaution Ativan 2mg IV Push prn    Diet: DASH  Activity: AAT   VTE PPX: Lovenox   GI PPX: PPI  Dispo: from home, detox, discharge when stable from opiate withdrawal

## 2024-07-30 NOTE — PROGRESS NOTE ADULT - SUBJECTIVE AND OBJECTIVE BOX
54yo female w/ pmhx of HTN, HLD, Seizure disorder, CORY/Depression, breast cancer s/p right mastectomy, Opioid abuse presenting w/ f n/v/d, tremors, anxiety, chills being admitted for opioid withdrawal.    Today:  Seen at bedside, states she is groggy and feels "bad all over".  I explained that this is part of the withdrawal process and that we would treat her symptoms.        REVIEW OF SYSTEMS:  no new complaints      MEDICATIONS  (STANDING):  buprenorphine 8 mG/naloxone 2 mG SL  Tablet 1 Tablet(s) SubLingual two times a day  enoxaparin Injectable 40 milliGRAM(s) SubCutaneous every 24 hours  hydrochlorothiazide 25 milliGRAM(s) Oral daily  levETIRAcetam 1000 milliGRAM(s) Oral two times a day  lisinopril 20 milliGRAM(s) Oral daily  risperiDONE   Tablet 2 milliGRAM(s) Oral daily  venlafaxine XR. 75 milliGRAM(s) Oral daily    MEDICATIONS  (PRN):  acetaminophen     Tablet .. 650 milliGRAM(s) Oral every 6 hours PRN Temp greater or equal to 38C (100.4F), Mild Pain (1 - 3)  aluminum hydroxide/magnesium hydroxide/simethicone Suspension 30 milliLiter(s) Oral every 4 hours PRN Dyspepsia  buprenorphine 2 mG/naloxone 0.5 mG SL  Tablet 2 Tablet(s) SubLingual every 4 hours PRN withdrawal sxs  hydrOXYzine hydrochloride 25 milliGRAM(s) Oral daily PRN Anxiety  loperamide 2 milliGRAM(s) Oral three times a day PRN Diarrhea  LORazepam   Injectable 2 milliGRAM(s) IV Push once PRN seizure  ondansetron Injectable 4 milliGRAM(s) IV Push every 8 hours PRN Nausea and/or Vomiting      Allergies  tramadol (Unknown)          Vital Signs Last 24 Hrs  T(C): 37.8 (30 Jul 2024 14:10), Max: 37.8 (29 Jul 2024 20:30)  T(F): 100.1 (30 Jul 2024 14:10), Max: 100.1 (29 Jul 2024 20:30)  HR: 110 (30 Jul 2024 14:10) (78 - 110)  BP: 154/102 (30 Jul 2024 14:10) (154/102 - 174/97)  RR: 16 (30 Jul 2024 14:10) (16 - 18)  SpO2: 100% (30 Jul 2024 14:10) (97% - 100%)    Parameters below as of 30 Jul 2024 14:10  Patient On (Oxygen Delivery Method): room air        PHYSICAL EXAM:  GENERAL:  54y/o Female, drowsy  HEAD:  Atraumatic, Normocephalic  EYES: EOMI, PERRLA, conjunctiva and sclera clear  MOUTH: + tongue fasiculations   CHEST/LUNG: Clear to auscultation bilaterally; No wheeze, rhonchi, or rales  HEART: Regular rate and rhythm; S1&S2  ABDOMEN: Soft, Nontender, Nondistended x 4 quadrants; Bowel sounds present  EXTREMITIES:   Peripheral Pulses Present, No clubbing, no cyanosis, or no edema, no calf tenderness  PSYCH: cooperative, appropriate  NEUROLOGY: AAO x 3      LABS:                        13.9   15.18 )-----------( 247      ( 30 Jul 2024 07:00 )             43.4     07-30    140  |  97<L>  |  15  ----------------------------<  142<H>  3.8   |  22  |  0.7    Ca    9.5      30 Jul 2024 07:00    TPro  8.2<H>  /  Alb  4.8  /  TBili  0.3  /  DBili  x   /  AST  25  /  ALT  26  /  AlkPhos  102  07-29      Urinalysis Basic - ( 30 Jul 2024 07:00 )    Color: x / Appearance: x / SG: x / pH: x  Gluc: 142 mg/dL / Ketone: x  / Bili: x / Urobili: x   Blood: x / Protein: x / Nitrite: x   Leuk Esterase: x / RBC: x / WBC x   Sq Epi: x / Non Sq Epi: x / Bacteria: x

## 2024-07-30 NOTE — CONSULT NOTE ADULT - PROBLEM SELECTOR RECOMMENDATION 9
After evaluation at this time will treat with suboxone 16mg daily  and stabilize for possible discharge in 24-48 hrs.  Pts concerns addressed  Pt will be monitored and supportive care provided.  No other changes to medical care plan for withdrawals.  Monitor labs/electrolytes as needed.    -Counseling provided   CATCH team involved for aftercare and pt will follow up with aftercare. to Outpatient.

## 2024-07-30 NOTE — CONSULT NOTE ADULT - SUBJECTIVE AND OBJECTIVE BOX
From ER:  Patient is a 52yo female w/ pmhx of HTN, HLD, Seizure disorder, CORY/Depression, breast cancer s/p right mastectomy, Opioid abuse presenting w/ opioid withdrawal sxs. Patient came in this morning w/ complaints of n/v/d, tremors, anxiety, chills, was given 16/4mg of Suboxone and was told to go to the methadone clinic. Reports saw the physician at methadone and was told that her sxs are acute and to return to the ER. Patient reports last using heroin on Thursday night. Reports since has been having chills, joint pain, tremors, n/v/d, anxiety prompting return to the ED. In the ED was given Suboxone 16mg again and .2mg of clonidine. Denies fever, chest pain, palpitation, dizziness, seizures, urinary sxs.         Pt interviewed, examined and EMR chart reviewed.  Pt admits to using opiates  x    months.   Last use   Hx of withdrawal   variable periods of sobriety in the past.  Has been in detox before _____yes,   _____No    SOCIAL HISTORY:    REVIEW OF SYSTEMS:    Constitutional: No fever, weight loss or fatigue  ENT:  No difficulty hearing, tinnitus, vertigo; No sinus or throat pain  Neck: No pain or stiffness  Respiratory: No cough, wheezing, chills or hemoptysis  Cardiovascular: No chest pain, palpitations, shortness of breath, dizziness or leg swelling  Gastrointestinal: No abdominal or epigastric pain. No nausea, vomiting or hematemesis; No diarrhea or constipation. No melena or hematochezia.  Neurological: No headaches, memory loss, loss of strength, numbness or tremors  Musculoskeletal: No joint pain or swelling; No muscle, back or extremity pain  Psychiatric: No depression, anxiety, mood swings or difficulty sleeping    MEDICATIONS  (STANDING):  enoxaparin Injectable 40 milliGRAM(s) SubCutaneous every 24 hours  hydrochlorothiazide 25 milliGRAM(s) Oral daily  lactated ringers. 1000 milliLiter(s) (75 mL/Hr) IV Continuous <Continuous>  levETIRAcetam 1000 milliGRAM(s) Oral two times a day  lisinopril 20 milliGRAM(s) Oral daily  risperiDONE   Tablet 2 milliGRAM(s) Oral daily  venlafaxine XR. 75 milliGRAM(s) Oral daily    MEDICATIONS  (PRN):  acetaminophen     Tablet .. 650 milliGRAM(s) Oral every 6 hours PRN Temp greater or equal to 38C (100.4F), Mild Pain (1 - 3)  aluminum hydroxide/magnesium hydroxide/simethicone Suspension 30 milliLiter(s) Oral every 4 hours PRN Dyspepsia  buprenorphine 2 mG/naloxone 0.5 mG SL  Tablet 2 Tablet(s) SubLingual every 4 hours PRN withdrawal sxs  hydrOXYzine hydrochloride 25 milliGRAM(s) Oral daily PRN Anxiety  loperamide 2 milliGRAM(s) Oral three times a day PRN Diarrhea  LORazepam   Injectable 2 milliGRAM(s) IV Push once PRN seizure  ondansetron Injectable 4 milliGRAM(s) IV Push every 8 hours PRN Nausea and/or Vomiting      Vital Signs Last 24 Hrs  T(C): 37.4 (30 Jul 2024 05:05), Max: 37.8 (29 Jul 2024 20:30)  T(F): 99.3 (30 Jul 2024 05:05), Max: 100.1 (29 Jul 2024 20:30)  HR: 83 (30 Jul 2024 06:55) (75 - 84)  BP: 167/91 (30 Jul 2024 06:55) (137/78 - 174/97)  BP(mean): --  RR: 18 (30 Jul 2024 06:55) (16 - 18)  SpO2: 97% (29 Jul 2024 20:30) (96% - 98%)    Parameters below as of 29 Jul 2024 14:20  Patient On (Oxygen Delivery Method): room air        PHYSICAL EXAM:    Constitutional: NAD, well-groomed, well-developed  HEENT: PERRLA, EOMI, Normal Hearing,   Neck: No LAD, No JVD  Back: Normal spine flexure, No CVA tenderness  Respiratory: CTAB/L  Cardiovascular: S1 and S2, RRR, no M/G/R  Gastrointestinal: BS+, soft, NT/ND  Extremities: No peripheral edema  Neurological: A/O x 3, no focal deficits    LABS:                        13.9   15.18 )-----------( 247      ( 30 Jul 2024 07:00 )             43.4     07-30    140  |  97<L>  |  15  ----------------------------<  142<H>  3.8   |  22  |  0.7    Ca    9.5      30 Jul 2024 07:00    TPro  8.2<H>  /  Alb  4.8  /  TBili  0.3  /  DBili  x   /  AST  25  /  ALT  26  /  AlkPhos  102  07-29      Urinalysis Basic - ( 30 Jul 2024 07:00 )    Color: x / Appearance: x / SG: x / pH: x  Gluc: 142 mg/dL / Ketone: x  / Bili: x / Urobili: x   Blood: x / Protein: x / Nitrite: x   Leuk Esterase: x / RBC: x / WBC x   Sq Epi: x / Non Sq Epi: x / Bacteria: x      Drug Screen Urine:  Alcohol Level        RADIOLOGY & ADDITIONAL STUDIES:   From ER:  Patient is a 52yo female w/ pmhx of HTN, HLD, Seizure disorder, CORY/Depression, breast cancer s/p right mastectomy, Opioid abuse presenting w/ opioid withdrawal sxs. Patient came in this morning w/ complaints of n/v/d, tremors, anxiety, chills, was given 16/4mg of Suboxone and was told to go to the methadone clinic. Reports saw the physician at methadone and was told that her sxs are acute and to return to the ER. Patient reports last using heroin on Thursday night. Reports since has been having chills, joint pain, tremors, n/v/d, anxiety prompting return to the ED. In the ED was given Suboxone 16mg again and .2mg of clonidine. Denies fever, chest pain, palpitation, dizziness, seizures, urinary sxs.         Pt interviewed, examined and EMR chart reviewed.  Pt admits to using opiates about 1 bundle per day intermittently over the last 4 years. Pt has been using consistently for months at this time. Pt stabilized on suboxone yesterday.   Pt denies any other substance abuse.    Hx of withdrawal   variable periods of sobriety in the past.  Has been in detox before ___X__yes,   _____No    SOCIAL HISTORY:    REVIEW OF SYSTEMS:    Constitutional: No fever, weight loss or fatigue  ENT:  No difficulty hearing, tinnitus, vertigo; No sinus or throat pain  Neck: No pain or stiffness  Respiratory: No cough, wheezing, chills or hemoptysis  Cardiovascular: No chest pain, palpitations, shortness of breath, dizziness or leg swelling  Gastrointestinal: No abdominal or epigastric pain. No nausea, vomiting or hematemesis; No diarrhea or constipation. No melena or hematochezia.  Neurological: No headaches, memory loss, loss of strength, numbness or tremors  Musculoskeletal: No joint pain or swelling; No muscle, back or extremity pain  Psychiatric: No depression, anxiety, mood swings or difficulty sleeping    MEDICATIONS  (STANDING):  enoxaparin Injectable 40 milliGRAM(s) SubCutaneous every 24 hours  hydrochlorothiazide 25 milliGRAM(s) Oral daily  lactated ringers. 1000 milliLiter(s) (75 mL/Hr) IV Continuous <Continuous>  levETIRAcetam 1000 milliGRAM(s) Oral two times a day  lisinopril 20 milliGRAM(s) Oral daily  risperiDONE   Tablet 2 milliGRAM(s) Oral daily  venlafaxine XR. 75 milliGRAM(s) Oral daily    MEDICATIONS  (PRN):  acetaminophen     Tablet .. 650 milliGRAM(s) Oral every 6 hours PRN Temp greater or equal to 38C (100.4F), Mild Pain (1 - 3)  aluminum hydroxide/magnesium hydroxide/simethicone Suspension 30 milliLiter(s) Oral every 4 hours PRN Dyspepsia  buprenorphine 2 mG/naloxone 0.5 mG SL  Tablet 2 Tablet(s) SubLingual every 4 hours PRN withdrawal sxs  hydrOXYzine hydrochloride 25 milliGRAM(s) Oral daily PRN Anxiety  loperamide 2 milliGRAM(s) Oral three times a day PRN Diarrhea  LORazepam   Injectable 2 milliGRAM(s) IV Push once PRN seizure  ondansetron Injectable 4 milliGRAM(s) IV Push every 8 hours PRN Nausea and/or Vomiting      Vital Signs Last 24 Hrs  T(C): 37.4 (30 Jul 2024 05:05), Max: 37.8 (29 Jul 2024 20:30)  T(F): 99.3 (30 Jul 2024 05:05), Max: 100.1 (29 Jul 2024 20:30)  HR: 83 (30 Jul 2024 06:55) (75 - 84)  BP: 167/91 (30 Jul 2024 06:55) (137/78 - 174/97)  BP(mean): --  RR: 18 (30 Jul 2024 06:55) (16 - 18)  SpO2: 97% (29 Jul 2024 20:30) (96% - 98%)    Parameters below as of 29 Jul 2024 14:20  Patient On (Oxygen Delivery Method): room air        PHYSICAL EXAM:    Constitutional: NAD, well-groomed, well-developed  HEENT: PERRLA, EOMI, Normal Hearing,   Neck: No LAD, No JVD  Back: Normal spine flexure, No CVA tenderness  Respiratory: CTAB/L  Cardiovascular: S1 and S2, RRR, no M/G/R  Gastrointestinal: BS+, soft, NT/ND  Extremities: No peripheral edema  Neurological: A/O x 3, no focal deficits    LABS:                        13.9   15.18 )-----------( 247      ( 30 Jul 2024 07:00 )             43.4     07-30    140  |  97<L>  |  15  ----------------------------<  142<H>  3.8   |  22  |  0.7    Ca    9.5      30 Jul 2024 07:00    TPro  8.2<H>  /  Alb  4.8  /  TBili  0.3  /  DBili  x   /  AST  25  /  ALT  26  /  AlkPhos  102  07-29      Urinalysis Basic - ( 30 Jul 2024 07:00 )    Color: x / Appearance: x / SG: x / pH: x  Gluc: 142 mg/dL / Ketone: x  / Bili: x / Urobili: x   Blood: x / Protein: x / Nitrite: x   Leuk Esterase: x / RBC: x / WBC x   Sq Epi: x / Non Sq Epi: x / Bacteria: x      Drug Screen Urine:  Alcohol Level        RADIOLOGY & ADDITIONAL STUDIES:

## 2024-07-31 LAB — PCP SPEC-MCNC: SIGNIFICANT CHANGE UP

## 2024-07-31 PROCEDURE — 99233 SBSQ HOSP IP/OBS HIGH 50: CPT

## 2024-07-31 PROCEDURE — 99231 SBSQ HOSP IP/OBS SF/LOW 25: CPT

## 2024-07-31 RX ORDER — BUPRENORPHINE HYDROCHLORIDE AND NALOXONE HYDROCHLORIDE 2; .5 MG/1; MG/1
2 TABLET SUBLINGUAL ONCE
Refills: 0 | Status: DISCONTINUED | OUTPATIENT
Start: 2024-07-31 | End: 2024-07-31

## 2024-07-31 RX ORDER — PROCHLORPERAZINE MALEATE 10 MG/1
5 TABLET, FILM COATED ORAL EVERY 8 HOURS
Refills: 0 | Status: DISCONTINUED | OUTPATIENT
Start: 2024-07-31 | End: 2024-08-01

## 2024-07-31 RX ORDER — DEXTROSE MONOHYDRATE, SODIUM CHLORIDE, SODIUM LACTATE, CALCIUM CHLORIDE, MAGNESIUM CHLORIDE 1.5; 538; 448; 18.4; 5.08 G/100ML; MG/100ML; MG/100ML; MG/100ML; MG/100ML
1000 SOLUTION INTRAPERITONEAL
Refills: 0 | Status: DISCONTINUED | OUTPATIENT
Start: 2024-07-31 | End: 2024-08-01

## 2024-07-31 RX ORDER — AMLODIPINE BESYLATE 2.5 MG/1
5 TABLET ORAL DAILY
Refills: 0 | Status: DISCONTINUED | OUTPATIENT
Start: 2024-07-31 | End: 2024-08-01

## 2024-07-31 RX ADMIN — LISINOPRIL 20 MILLIGRAM(S): 10 TABLET ORAL at 05:26

## 2024-07-31 RX ADMIN — RISPERIDONE 2 MILLIGRAM(S): 1 TABLET, FILM COATED ORAL at 11:19

## 2024-07-31 RX ADMIN — AMLODIPINE BESYLATE 5 MILLIGRAM(S): 2.5 TABLET ORAL at 12:49

## 2024-07-31 RX ADMIN — Medication 25 MILLIGRAM(S): at 01:49

## 2024-07-31 RX ADMIN — VENLAFAXINE 75 MILLIGRAM(S): 25 TABLET ORAL at 11:19

## 2024-07-31 RX ADMIN — LEVETIRACETAM 1000 MILLIGRAM(S): 1000 TABLET, FILM COATED ORAL at 17:28

## 2024-07-31 RX ADMIN — BUPRENORPHINE HYDROCHLORIDE AND NALOXONE HYDROCHLORIDE 1 TABLET(S): 2; .5 TABLET SUBLINGUAL at 05:26

## 2024-07-31 RX ADMIN — BUPRENORPHINE HYDROCHLORIDE AND NALOXONE HYDROCHLORIDE 1 TABLET(S): 2; .5 TABLET SUBLINGUAL at 17:29

## 2024-07-31 RX ADMIN — Medication 4 MILLIGRAM(S): at 09:57

## 2024-07-31 RX ADMIN — LEVETIRACETAM 1000 MILLIGRAM(S): 1000 TABLET, FILM COATED ORAL at 05:26

## 2024-07-31 RX ADMIN — BUPRENORPHINE HYDROCHLORIDE AND NALOXONE HYDROCHLORIDE 2 TABLET(S): 2; .5 TABLET SUBLINGUAL at 12:49

## 2024-07-31 RX ADMIN — ENOXAPARIN SODIUM 40 MILLIGRAM(S): 120 INJECTION SUBCUTANEOUS at 05:26

## 2024-07-31 RX ADMIN — DEXTROSE MONOHYDRATE, SODIUM CHLORIDE, SODIUM LACTATE, CALCIUM CHLORIDE, MAGNESIUM CHLORIDE 50 MILLILITER(S): 1.5; 538; 448; 18.4; 5.08 SOLUTION INTRAPERITONEAL at 12:49

## 2024-07-31 NOTE — PROGRESS NOTE ADULT - SUBJECTIVE AND OBJECTIVE BOX
Pt interviewed, examined and EMR chart reviewed.    Follow up of Opiate Addiction. Pt is on Suboxone induction. Pt is doing better but still in mild withdrawals Pt with positive nausea and vomiting with muscle aches.       REVIEW OF SYSTEMS:    Constitutional: No fever, weight loss or fatigue  ENT:  No difficulty hearing, tinnitus, vertigo; No sinus or throat pain  Neck: No pain or stiffness  Respiratory: No cough, wheezing, chills or hemoptysis  Cardiovascular: No chest pain, palpitations, shortness of breath, dizziness or leg swelling  Gastrointestinal: No abdominal or epigastric pain. No nausea, vomiting or hematemesis; No diarrhea or constipation. No melena or hematochezia.  Neurological: No headaches, memory loss, loss of strength, numbness or tremors  Musculoskeletal: No joint pain or swelling; No muscle, back or extremity pain      MEDICATIONS  (STANDING):  buprenorphine 8 mG/naloxone 2 mG SL  Tablet 1 Tablet(s) SubLingual two times a day  enoxaparin Injectable 40 milliGRAM(s) SubCutaneous every 24 hours  hydrochlorothiazide 25 milliGRAM(s) Oral daily  levETIRAcetam 1000 milliGRAM(s) Oral two times a day  lisinopril 20 milliGRAM(s) Oral daily  risperiDONE   Tablet 2 milliGRAM(s) Oral daily  venlafaxine XR. 75 milliGRAM(s) Oral daily    MEDICATIONS  (PRN):  acetaminophen     Tablet .. 650 milliGRAM(s) Oral every 6 hours PRN Temp greater or equal to 38C (100.4F), Mild Pain (1 - 3)  aluminum hydroxide/magnesium hydroxide/simethicone Suspension 30 milliLiter(s) Oral every 4 hours PRN Dyspepsia  buprenorphine 2 mG/naloxone 0.5 mG SL  Tablet 2 Tablet(s) SubLingual every 4 hours PRN withdrawal sxs  hydrOXYzine hydrochloride 25 milliGRAM(s) Oral daily PRN Anxiety  loperamide 2 milliGRAM(s) Oral three times a day PRN Diarrhea  LORazepam   Injectable 2 milliGRAM(s) IV Push once PRN seizure  ondansetron Injectable 4 milliGRAM(s) IV Push every 8 hours PRN Nausea and/or Vomiting      Vital Signs Last 24 Hrs  T(C): 36.9 (31 Jul 2024 05:22), Max: 37.8 (30 Jul 2024 14:10)  T(F): 98.4 (31 Jul 2024 05:22), Max: 100.1 (30 Jul 2024 14:10)  HR: 123 (31 Jul 2024 05:22) (90 - 123)  BP: 143/99 (31 Jul 2024 05:22) (143/99 - 163/110)  BP(mean): --  RR: 18 (31 Jul 2024 05:22) (16 - 18)  SpO2: 95% (30 Jul 2024 20:58) (95% - 100%)    Parameters below as of 30 Jul 2024 20:58  Patient On (Oxygen Delivery Method): room air        PHYSICAL EXAM:    Constitutional: NAD, well-groomed, well-developed  HEENT: PERRLA, EOMI, Normal Hearing,   Neck: No LAD, No JVD  Back: Normal spine flexure, No CVA tenderness  Respiratory: CTAB/L  Cardiovascular: S1 and S2, RRR, no M/G/R  Gastrointestinal: BS+, soft, NT/ND  Extremities: No peripheral edema  Neurological: A/O x 3, no focal deficits    LABS:                        13.9   15.18 )-----------( 247      ( 30 Jul 2024 07:00 )             43.4     07-30    140  |  97<L>  |  15  ----------------------------<  142<H>  3.8   |  22  |  0.7    Ca    9.5      30 Jul 2024 07:00        Urinalysis Basic - ( 30 Jul 2024 07:00 )    Color: x / Appearance: x / SG: x / pH: x  Gluc: 142 mg/dL / Ketone: x  / Bili: x / Urobili: x   Blood: x / Protein: x / Nitrite: x   Leuk Esterase: x / RBC: x / WBC x   Sq Epi: x / Non Sq Epi: x / Bacteria: x      Drug Screen Urine:  Alcohol Level        RADIOLOGY & ADDITIONAL STUDIES:

## 2024-07-31 NOTE — PROGRESS NOTE ADULT - SUBJECTIVE AND OBJECTIVE BOX
SUBJECTIVE:    Patient is a 53y old Female who presents with a chief complaint of   Currently admitted to medicine with the primary diagnosis of Opiate addiction       Today is hospital day 2d. This morning she is resting comfortably in bed and reports no new issues or overnight events.     reports nausea and decreased appetite along with tearing in the eyes and sniffles     ROS:   CONSTITUTIONAL: No weakness, fevers or chills   EYES/ENT: No visual changes; No vertigo or throat pain   NECK: No pain or stiffness   RESPIRATORY: No cough, wheezing, hemoptysis; No shortness of breath   CARDIOVASCULAR: No chest pain or palpitations   GASTROINTESTINAL: No abdominal or epigastric pain. No nausea, vomiting, or hematemesis; No diarrhea or constipation. No melena or hematochezia.  GENITOURINARY: No dysuria, frequency or hematuria  NEUROLOGICAL: No numbness or weakness        PAST MEDICAL & SURGICAL HISTORY  HTN (hypertension)    Seizure    HLD (hyperlipidemia)    Anxiety and depression      SOCIAL HISTORY:    ALLERGIES:  tramadol (Unknown)    MEDICATIONS:  STANDING MEDICATIONS  buprenorphine 8 mG/naloxone 2 mG SL  Tablet 1 Tablet(s) SubLingual two times a day  enoxaparin Injectable 40 milliGRAM(s) SubCutaneous every 24 hours  hydrochlorothiazide 25 milliGRAM(s) Oral daily  lactated ringers. 1000 milliLiter(s) IV Continuous <Continuous>  levETIRAcetam 1000 milliGRAM(s) Oral two times a day  lisinopril 20 milliGRAM(s) Oral daily  risperiDONE   Tablet 2 milliGRAM(s) Oral daily  venlafaxine XR. 75 milliGRAM(s) Oral daily    PRN MEDICATIONS  acetaminophen     Tablet .. 650 milliGRAM(s) Oral every 6 hours PRN  aluminum hydroxide/magnesium hydroxide/simethicone Suspension 30 milliLiter(s) Oral every 4 hours PRN  buprenorphine 2 mG/naloxone 0.5 mG SL  Tablet 2 Tablet(s) SubLingual every 4 hours PRN  hydrOXYzine hydrochloride 25 milliGRAM(s) Oral daily PRN  loperamide 2 milliGRAM(s) Oral three times a day PRN  LORazepam   Injectable 2 milliGRAM(s) IV Push once PRN  ondansetron Injectable 4 milliGRAM(s) IV Push every 8 hours PRN    VITALS:   T(F): 98.4  HR: 123  BP: 143/99  RR: 18  SpO2: 95%    LABS:  Negative for smoking/alcohol/drug use.                         13.9   15.18 )-----------( 247      ( 30 Jul 2024 07:00 )             43.4     07-30    140  |  97<L>  |  15  ----------------------------<  142<H>  3.8   |  22  |  0.7    Ca    9.5      30 Jul 2024 07:00        Urinalysis Basic - ( 30 Jul 2024 07:00 )    Color: x / Appearance: x / SG: x / pH: x  Gluc: 142 mg/dL / Ketone: x  / Bili: x / Urobili: x   Blood: x / Protein: x / Nitrite: x   Leuk Esterase: x / RBC: x / WBC x   Sq Epi: x / Non Sq Epi: x / Bacteria: x      RADIOLOGY:    PHYSICAL EXAM:  GEN: No acute distress  HEENT: normocephalic, atraumatic, aniceteric  LUNGS: bl breath sounds   HEART: S1/S2 present. RRR, no murmurs  ABD: Soft, non-tender, non-distended. Bowel sounds present  EXT: no edema   NEURO: AAOX3, normal affect      ASSESSMENT AND PLAN:    52yo female w/ pmhx of HTN, HLD, Seizure disorder, CORY/Depression, breast cancer s/p right mastectomy, Opioid abuse presenting w/ f n/v/d, tremors, anxiety, chills being admitted for opioid withdrawal.    #Opiate withdrawal   - s/p Suboxone 8mg/2mg x4, Clonidine .2mg X1 in ED  - changed zofran to compazine given no improvement in symptoms (reglan not advised due to cross react w/ risperdal)   - Hydroxyzine 25mg QD prn for anxiety   - Loperamide 2mg Q8 prn for diarrhea   - Addiction eval appreciated -  initiated  Suboxone - dosing per CATCH team   - Urine Drug screen     #Uncontrolled blood pressure   #H/O HTN   - on Lisinopril 20mg QD and HCTZ 25mg QD at home   - added amlodipine 5 mg qd     #H/O HLD - c/w Lipitor 10mg QD    #H/O CORY/Depression: c/w risperidone 3mg QD and Venlafaxine ER 75mg QD    #H/O Seizure disorder - c/w Keppra 1000mg BID, Seizure precaution Ativan 2mg IV Push prn    dvt/ gi ppx/diet  dispo: acute - possible 24 hrs   handoff: improvement in withdrawal symptoms

## 2024-08-01 ENCOUNTER — TRANSCRIPTION ENCOUNTER (OUTPATIENT)
Age: 54
End: 2024-08-01

## 2024-08-01 VITALS
DIASTOLIC BLOOD PRESSURE: 65 MMHG | RESPIRATION RATE: 18 BRPM | SYSTOLIC BLOOD PRESSURE: 92 MMHG | HEART RATE: 109 BPM | TEMPERATURE: 97 F

## 2024-08-01 DIAGNOSIS — E87.6 HYPOKALEMIA: ICD-10-CM

## 2024-08-01 LAB
ANION GAP SERPL CALC-SCNC: 14 MMOL/L — SIGNIFICANT CHANGE UP (ref 7–14)
ANION GAP SERPL CALC-SCNC: 16 MMOL/L — HIGH (ref 7–14)
BASOPHILS # BLD AUTO: 0.05 K/UL — SIGNIFICANT CHANGE UP (ref 0–0.2)
BASOPHILS NFR BLD AUTO: 0.4 % — SIGNIFICANT CHANGE UP (ref 0–1)
BUN SERPL-MCNC: 18 MG/DL — SIGNIFICANT CHANGE UP (ref 10–20)
BUN SERPL-MCNC: 22 MG/DL — HIGH (ref 10–20)
CALCIUM SERPL-MCNC: 9.3 MG/DL — SIGNIFICANT CHANGE UP (ref 8.4–10.5)
CALCIUM SERPL-MCNC: 9.8 MG/DL — SIGNIFICANT CHANGE UP (ref 8.4–10.5)
CHLORIDE SERPL-SCNC: 92 MMOL/L — LOW (ref 98–110)
CHLORIDE SERPL-SCNC: 94 MMOL/L — LOW (ref 98–110)
CO2 SERPL-SCNC: 28 MMOL/L — SIGNIFICANT CHANGE UP (ref 17–32)
CO2 SERPL-SCNC: 29 MMOL/L — SIGNIFICANT CHANGE UP (ref 17–32)
CREAT SERPL-MCNC: 0.8 MG/DL — SIGNIFICANT CHANGE UP (ref 0.7–1.5)
CREAT SERPL-MCNC: 0.9 MG/DL — SIGNIFICANT CHANGE UP (ref 0.7–1.5)
EGFR: 76 ML/MIN/1.73M2 — SIGNIFICANT CHANGE UP
EGFR: 88 ML/MIN/1.73M2 — SIGNIFICANT CHANGE UP
EOSINOPHIL # BLD AUTO: 0.01 K/UL — SIGNIFICANT CHANGE UP (ref 0–0.7)
EOSINOPHIL NFR BLD AUTO: 0.1 % — SIGNIFICANT CHANGE UP (ref 0–8)
GLUCOSE SERPL-MCNC: 126 MG/DL — HIGH (ref 70–99)
GLUCOSE SERPL-MCNC: 165 MG/DL — HIGH (ref 70–99)
HCT VFR BLD CALC: 46.3 % — SIGNIFICANT CHANGE UP (ref 37–47)
HGB BLD-MCNC: 14.8 G/DL — SIGNIFICANT CHANGE UP (ref 12–16)
IMM GRANULOCYTES NFR BLD AUTO: 0.4 % — HIGH (ref 0.1–0.3)
LYMPHOCYTES # BLD AUTO: 33.7 % — SIGNIFICANT CHANGE UP (ref 20.5–51.1)
LYMPHOCYTES # BLD AUTO: 4.41 K/UL — HIGH (ref 1.2–3.4)
MAGNESIUM SERPL-MCNC: 2.1 MG/DL — SIGNIFICANT CHANGE UP (ref 1.8–2.4)
MCHC RBC-ENTMCNC: 20.9 PG — LOW (ref 27–31)
MCHC RBC-ENTMCNC: 32 G/DL — SIGNIFICANT CHANGE UP (ref 32–37)
MCV RBC AUTO: 65.3 FL — LOW (ref 81–99)
MONOCYTES # BLD AUTO: 0.81 K/UL — HIGH (ref 0.1–0.6)
MONOCYTES NFR BLD AUTO: 6.2 % — SIGNIFICANT CHANGE UP (ref 1.7–9.3)
NEUTROPHILS # BLD AUTO: 7.77 K/UL — HIGH (ref 1.4–6.5)
NEUTROPHILS NFR BLD AUTO: 59.2 % — SIGNIFICANT CHANGE UP (ref 42.2–75.2)
NRBC # BLD: 0 /100 WBCS — SIGNIFICANT CHANGE UP (ref 0–0)
PLATELET # BLD AUTO: 227 K/UL — SIGNIFICANT CHANGE UP (ref 130–400)
PMV BLD: 10 FL — SIGNIFICANT CHANGE UP (ref 7.4–10.4)
POTASSIUM SERPL-MCNC: 3 MMOL/L — LOW (ref 3.5–5)
POTASSIUM SERPL-MCNC: 3.4 MMOL/L — LOW (ref 3.5–5)
POTASSIUM SERPL-SCNC: 3 MMOL/L — LOW (ref 3.5–5)
POTASSIUM SERPL-SCNC: 3.4 MMOL/L — LOW (ref 3.5–5)
RBC # BLD: 7.09 M/UL — HIGH (ref 4.2–5.4)
RBC # FLD: 17 % — HIGH (ref 11.5–14.5)
SODIUM SERPL-SCNC: 135 MMOL/L — SIGNIFICANT CHANGE UP (ref 135–146)
SODIUM SERPL-SCNC: 138 MMOL/L — SIGNIFICANT CHANGE UP (ref 135–146)
WBC # BLD: 13.1 K/UL — HIGH (ref 4.8–10.8)
WBC # FLD AUTO: 13.1 K/UL — HIGH (ref 4.8–10.8)

## 2024-08-01 PROCEDURE — 99222 1ST HOSP IP/OBS MODERATE 55: CPT

## 2024-08-01 PROCEDURE — 99239 HOSP IP/OBS DSCHRG MGMT >30: CPT

## 2024-08-01 RX ORDER — ONDANSETRON HCL/PF 4 MG/2 ML
1 VIAL (ML) INJECTION
Qty: 21 | Refills: 0
Start: 2024-08-01 | End: 2024-08-07

## 2024-08-01 RX ORDER — POTASSIUM CHLORIDE 1500 MG/1
20 TABLET, EXTENDED RELEASE ORAL
Refills: 0 | Status: COMPLETED | OUTPATIENT
Start: 2024-08-01 | End: 2024-08-01

## 2024-08-01 RX ORDER — AMLODIPINE BESYLATE 2.5 MG/1
1 TABLET ORAL
Qty: 30 | Refills: 0
Start: 2024-08-01 | End: 2024-08-30

## 2024-08-01 RX ORDER — BUPRENORPHINE HYDROCHLORIDE AND NALOXONE HYDROCHLORIDE 2; .5 MG/1; MG/1
1 TABLET SUBLINGUAL THREE TIMES A DAY
Refills: 0 | Status: DISCONTINUED | OUTPATIENT
Start: 2024-08-01 | End: 2024-08-01

## 2024-08-01 RX ORDER — BUPRENORPHINE HYDROCHLORIDE AND NALOXONE HYDROCHLORIDE 2; .5 MG/1; MG/1
1 TABLET SUBLINGUAL
Qty: 1 | Refills: 0
Start: 2024-08-01 | End: 2024-08-07

## 2024-08-01 RX ADMIN — RISPERIDONE 2 MILLIGRAM(S): 1 TABLET, FILM COATED ORAL at 12:00

## 2024-08-01 RX ADMIN — BUPRENORPHINE HYDROCHLORIDE AND NALOXONE HYDROCHLORIDE 1 TABLET(S): 2; .5 TABLET SUBLINGUAL at 14:09

## 2024-08-01 RX ADMIN — ENOXAPARIN SODIUM 40 MILLIGRAM(S): 120 INJECTION SUBCUTANEOUS at 05:26

## 2024-08-01 RX ADMIN — AMLODIPINE BESYLATE 5 MILLIGRAM(S): 2.5 TABLET ORAL at 05:23

## 2024-08-01 RX ADMIN — BUPRENORPHINE HYDROCHLORIDE AND NALOXONE HYDROCHLORIDE 1 TABLET(S): 2; .5 TABLET SUBLINGUAL at 05:22

## 2024-08-01 RX ADMIN — VENLAFAXINE 75 MILLIGRAM(S): 25 TABLET ORAL at 11:37

## 2024-08-01 RX ADMIN — LISINOPRIL 20 MILLIGRAM(S): 10 TABLET ORAL at 05:23

## 2024-08-01 RX ADMIN — POTASSIUM CHLORIDE 20 MILLIEQUIVALENT(S): 1500 TABLET, EXTENDED RELEASE ORAL at 11:38

## 2024-08-01 RX ADMIN — POTASSIUM CHLORIDE 20 MILLIEQUIVALENT(S): 1500 TABLET, EXTENDED RELEASE ORAL at 13:02

## 2024-08-01 RX ADMIN — LEVETIRACETAM 1000 MILLIGRAM(S): 1000 TABLET, FILM COATED ORAL at 05:23

## 2024-08-01 NOTE — PROGRESS NOTE ADULT - SUBJECTIVE AND OBJECTIVE BOX
Patient seen and examined at bedside this morning. Patient denies any nausea or vomiting and states she is feeling much better. No acute events overnight.     Vital Signs:  T(C): 37.3 (01 Aug 2024 05:17), Max: 37.7 (31 Jul 2024 20:30)  T(F): 99.1 (01 Aug 2024 05:17), Max: 99.9 (31 Jul 2024 20:30)  HR: 87 (31 Jul 2024 20:30) (87 - 126)  BP: 143/97 (01 Aug 2024 05:17) (97/77 - 148/83)  RR: 18 (01 Aug 2024 05:17) (18 - 18)  SpO2: 100% (01 Aug 2024 05:17) (96% - 100%)    Physical exam:  GENERAL: NAD, sitting on bed comfortably eating breakfast  HEAD:  Atraumatic, Normocephalic  EYES: EOMI, PERRLA, conjunctiva and sclera clear  ENT: Moist mucous membranes, no tongue fasciculations  NECK: Supple, No JVD  CHEST/LUNG: Clear to auscultation bilaterally with unlabored respirations  HEART: Regular rate and rhythm; no murmurs, rubs, or gallops  ABDOMEN: Bowel sounds present; Soft, Nontender, and Nondistended. No hepatomegaly or splenomegaly.   EXTREMITIES:  2+ Peripheral Pulses with no clubbing, cyanosis, or edema  NERVOUS SYSTEM:  Alert & Oriented X3, speech clear.   MSK: FROM all 4 extremities, full and equal strength  SKIN: No rashes or lesions      Labs and results:                       14.8   13.10 )-----------( 227      ( 01 Aug 2024 06:28 )             46.3       08-01    138  |  94<L>  |  18  ----------------------------<  165<H>  3.0<L>   |  28  |  0.8    Ca    9.3      01 Aug 2024 06:28  Mg     2.1     08-01    Magnesium: 2.1 mg/dL (08-01-24 @ 06:28)      Urinalysis Basic - ( 01 Aug 2024 06:28 )    Color: x / Appearance: x / SG: x / pH: x  Gluc: 165 mg/dL / Ketone: x  / Bili: x / Urobili: x   Blood: x / Protein: x / Nitrite: x   Leuk Esterase: x / RBC: x / WBC x   Sq Epi: x / Non Sq Epi: x / Bacteria: x               Patient seen and examined at bedside this morning. Patient denies any nausea or vomiting and states she is feeling much better. No acute events overnight.     Review of Systems:  CONSTITUTIONAL: No weakness, fevers or chills  EYES/ENT: No visual changes or vertigo   NECK: No pain or stiffness  RESPIRATORY: No shortness of breath, No cough, wheezing, hemoptysis  CARDIOVASCULAR: No chest pain or palpitations  GASTROINTESTINAL: No abdominal or epigastric pain. No nausea, vomiting, or hematemesis; No diarrhea or constipation.   GENITOURINARY: No dysuria, frequency or hematuria  NEUROLOGICAL: No numbness or weakness  SKIN: No itching, burning, rashes, or lesions   All other review of systems is negative unless indicated above.    Past Medical History:  HTN (hypertension)  Seizure  HLD (hyperlipidemia)  Anxiety and depression    Surgical history:  None    Allergies:  tramadol (Unknown)    Medications:  STANDING MEDICATIONS  buprenorphine 8 mG/naloxone 2 mG SL  Tablet 1 Tablet(s) SubLingual two times a day  enoxaparin Injectable 40 milliGRAM(s) SubCutaneous every 24 hours  hydrochlorothiazide 25 milliGRAM(s) Oral daily  lactated ringers. 1000 milliLiter(s) IV Continuous <Continuous>  levETIRAcetam 1000 milliGRAM(s) Oral two times a day  lisinopril 20 milliGRAM(s) Oral daily  risperiDONE   Tablet 2 milliGRAM(s) Oral daily  venlafaxine XR. 75 milliGRAM(s) Oral daily    PRN MEDICATIONS  acetaminophen     Tablet .. 650 milliGRAM(s) Oral every 6 hours PRN  aluminum hydroxide/magnesium hydroxide/simethicone Suspension 30 milliLiter(s) Oral every 4 hours PRN  buprenorphine 2 mG/naloxone 0.5 mG SL  Tablet 2 Tablet(s) SubLingual every 4 hours PRN  hydrOXYzine hydrochloride 25 milliGRAM(s) Oral daily PRN  loperamide 2 milliGRAM(s) Oral three times a day PRN  LORazepam   Injectable 2 milliGRAM(s) IV Push once PRN  ondansetron Injectable 4 milliGRAM(s) IV Push every 8 hours PRN    Vital Signs:  T(F): 99.1, Max: 99.9   HR: 87   BP: 143/97   RR: 18   SpO2: 100%    Physical exam:  GENERAL: NAD, sitting on bed comfortably eating breakfast  HEAD:  Atraumatic, Normocephalic  EYES: EOMI, PERRLA, conjunctiva and sclera clear  ENT: Moist mucous membranes, no tongue fasciculations  NECK: Supple, No JVD  CHEST/LUNG: Clear to auscultation bilaterally with unlabored respirations  HEART: Regular rate and rhythm; no murmurs, rubs, or gallops  ABDOMEN: Bowel sounds present; Soft, Nontender, and Nondistended. No hepatomegaly or splenomegaly.   EXTREMITIES:  2+ Peripheral Pulses with no clubbing, cyanosis, or edema  NERVOUS SYSTEM:  Alert & Oriented X3, speech clear.   MSK: FROM all 4 extremities, full and equal strength  SKIN: No rashes or lesions    Labs and results:                       14.8   13.10 )-----------( 227      ( 01 Aug 2024 06:28 )             46.3       08-01    138  |  94<L>  |  18  ----------------------------<  165<H>  3.0<L>   |  28  |  0.8    Ca    9.3      01 Aug 2024 06:28  Mg     2.1     08-01    Magnesium: 2.1 mg/dL (08-01-24 @ 06:28)      Urinalysis Basic - ( 01 Aug 2024 06:28 )    Color: x / Appearance: x / SG: x / pH: x  Gluc: 165 mg/dL / Ketone: x  / Bili: x / Urobili: x   Blood: x / Protein: x / Nitrite: x   Leuk Esterase: x / RBC: x / WBC x   Sq Epi: x / Non Sq Epi: x / Bacteria: x               Patient seen and examined at bedside this morning. Patient denies any nausea or vomiting and reports she is feeling much better. Patient also denies chest pain, shortness of breath, fevers, chills, abdominal pain, diarrhea, constipation, and weakness. Her nausea improved after she was given Compazine. Patient has been tolerating regular diet since last night. No acute events overnight.     Past Medical History:  HTN (hypertension)  Seizure  HLD (hyperlipidemia)  Anxiety and depression    Surgical history:  None    Allergies:  tramadol (reaction unknown)    Medications:  STANDING MEDICATIONS  buprenorphine 8 mG/naloxone 2 mG SL  Tablet 1 Tablet(s) SubLingual two times a day  enoxaparin Injectable 40 milliGRAM(s) SubCutaneous every 24 hours  hydrochlorothiazide 25 milliGRAM(s) Oral daily  lactated ringers. 1000 milliLiter(s) IV Continuous <Continuous>  levETIRAcetam 1000 milliGRAM(s) Oral two times a day  lisinopril 20 milliGRAM(s) Oral daily  risperiDONE   Tablet 2 milliGRAM(s) Oral daily  venlafaxine XR. 75 milliGRAM(s) Oral daily    PRN MEDICATIONS  acetaminophen     Tablet .. 650 milliGRAM(s) Oral every 6 hours PRN  aluminum hydroxide/magnesium hydroxide/simethicone Suspension 30 milliLiter(s) Oral every 4 hours PRN  buprenorphine 2 mG/naloxone 0.5 mG SL  Tablet 2 Tablet(s) SubLingual every 4 hours PRN  hydrOXYzine hydrochloride 25 milliGRAM(s) Oral daily PRN  loperamide 2 milliGRAM(s) Oral three times a day PRN  LORazepam   Injectable 2 milliGRAM(s) IV Push once PRN  ondansetron Injectable 4 milliGRAM(s) IV Push every 8 hours PRN    Vital Signs:  T(F): 99.1, Max: 99.9   HR: 87   BP: 143/97   RR: 18   SpO2: 100%    Physical exam:  GENERAL: NAD, sitting on bed comfortably eating breakfast  HEAD:  Atraumatic, Normocephalic  EYES: EOMI, conjunctiva and sclera clear  ENT: Moist mucous membranes, no tongue fasciculations  NECK: Supple  CHEST/LUNG: Clear to auscultation bilaterally with unlabored respirations  HEART: Regular rate and rhythm; no murmurs, rubs, or gallops  ABDOMEN: Bowel sounds present; Soft, Nontender, and Nondistended. No hepatomegaly or splenomegaly.   EXTREMITIES:  2+ Peripheral Pulses with no cyanosis or edema  NERVOUS SYSTEM:  Alert & Oriented X3, speech clear.   SKIN: No rashes or lesions    Labs and results:                       14.8   13.10 )-----------( 227      ( 01 Aug 2024 06:28 )             46.3       08-01    138  |  94<L>  |  18  ----------------------------<  165<H>  3.0<L>   |  28  |  0.8    Ca    9.3      01 Aug 2024 06:28  Mg     2.1     08-01    Magnesium: 2.1 mg/dL (08-01-24 @ 06:28)      Urinalysis Basic - ( 01 Aug 2024 06:28 )    Color: x / Appearance: x / SG: x / pH: x  Gluc: 165 mg/dL / Ketone: x  / Bili: x / Urobili: x   Blood: x / Protein: x / Nitrite: x   Leuk Esterase: x / RBC: x / WBC x   Sq Epi: x / Non Sq Epi: x / Bacteria: x               Patient seen and examined at bedside this morning. Patient denies any nausea or vomiting and reports she is feeling much better. Patient also denies chest pain, shortness of breath, fevers, chills, abdominal pain, diarrhea, constipation, and weakness. Her nausea improved after she was given Compazine. Patient has been tolerating regular diet since last night. No acute events overnight.     Past Medical History:  HTN (hypertension)  Seizure  HLD (hyperlipidemia)  Anxiety and depression    Surgical history:  None    Allergies:  tramadol (reaction unknown)    Medications:  STANDING MEDICATIONS  buprenorphine 8 mG/naloxone 2 mG SL  Tablet 1 Tablet(s) SubLingual two times a day  enoxaparin Injectable 40 milliGRAM(s) SubCutaneous every 24 hours  hydrochlorothiazide 25 milliGRAM(s) Oral daily  lactated ringers. 1000 milliLiter(s) IV Continuous <Continuous>  levETIRAcetam 1000 milliGRAM(s) Oral two times a day  lisinopril 20 milliGRAM(s) Oral daily  risperiDONE   Tablet 2 milliGRAM(s) Oral daily  venlafaxine XR. 75 milliGRAM(s) Oral daily    PRN MEDICATIONS  acetaminophen     Tablet .. 650 milliGRAM(s) Oral every 6 hours PRN  aluminum hydroxide/magnesium hydroxide/simethicone Suspension 30 milliLiter(s) Oral every 4 hours PRN  buprenorphine 2 mG/naloxone 0.5 mG SL  Tablet 2 Tablet(s) SubLingual every 4 hours PRN  hydrOXYzine hydrochloride 25 milliGRAM(s) Oral daily PRN  loperamide 2 milliGRAM(s) Oral three times a day PRN  LORazepam   Injectable 2 milliGRAM(s) IV Push once PRN  ondansetron Injectable 4 milliGRAM(s) IV Push every 8 hours PRN    Vital Signs:  T(F): 99.1, Max: 99.9   HR: 87   BP: 143/97   RR: 18   SpO2: 100%    Physical exam:  GENERAL: NAD, sitting on bed comfortably eating breakfast  HEAD:  Atraumatic, Normocephalic  ENT: No tongue fasciculations  NECK: Supple  CHEST/LUNG: Clear to auscultation bilaterally with unlabored respirations  HEART: Regular rate and rhythm; no murmurs, rubs, or gallops  ABDOMEN: Bowel sounds present; Soft, Nontender, and Nondistended. No hepatomegaly or splenomegaly.   EXTREMITIES:  2+ Peripheral Pulses with no cyanosis or edema  NERVOUS SYSTEM:  Alert & Oriented X3, speech clear.   SKIN: No rashes or lesions    Labs and results:                       14.8   13.10 )-----------( 227      ( 01 Aug 2024 06:28 )             46.3       08-01    138  |  94<L>  |  18  ----------------------------<  165<H>  3.0<L>   |  28  |  0.8    Ca    9.3      01 Aug 2024 06:28  Mg     2.1     08-01    Magnesium: 2.1 mg/dL (08-01-24 @ 06:28)      Urinalysis Basic - ( 01 Aug 2024 06:28 )    Color: x / Appearance: x / SG: x / pH: x  Gluc: 165 mg/dL / Ketone: x  / Bili: x / Urobili: x   Blood: x / Protein: x / Nitrite: x   Leuk Esterase: x / RBC: x / WBC x   Sq Epi: x / Non Sq Epi: x / Bacteria: x

## 2024-08-01 NOTE — DISCHARGE NOTE PROVIDER - PROVIDER TOKENS
PROVIDER:[TOKEN:[[id number]]],FREE:[LAST:[pcp],FIRST:[primary care],PHONE:[(   )    -],FAX:[(   )    -]]

## 2024-08-01 NOTE — DISCHARGE NOTE PROVIDER - CARE PROVIDER_API CALL
Mil Lopez  Physician Assistant Services  [address] [place] [id number]  Phone: [phone]  Fax: [phone]  Follow Up Time:     pcp, primary care  Phone: (   )    -  Fax: (   )    -  Follow Up Time:

## 2024-08-01 NOTE — PROGRESS NOTE ADULT - SUBJECTIVE AND OBJECTIVE BOX
Pt interviewed, examined and EMR chart reviewed.    Follow up of Opiate Addiction. Pt is on Suboxone induction. Pt is doing better.       REVIEW OF SYSTEMS:    Constitutional: No fever, weight loss or fatigue  ENT:  No difficulty hearing, tinnitus, vertigo; No sinus or throat pain  Neck: No pain or stiffness  Respiratory: No cough, wheezing, chills or hemoptysis  Cardiovascular: No chest pain, palpitations, shortness of breath, dizziness or leg swelling  Gastrointestinal: No abdominal or epigastric pain. No nausea, vomiting or hematemesis; No diarrhea or constipation. No melena or hematochezia.  Neurological: No headaches, memory loss, loss of strength, numbness or tremors  Musculoskeletal: No joint pain or swelling; No muscle, back or extremity pain      MEDICATIONS  (STANDING):  amLODIPine   Tablet 5 milliGRAM(s) Oral daily  buprenorphine 8 mG/naloxone 2 mG SL  Tablet 1 Tablet(s) SubLingual two times a day  enoxaparin Injectable 40 milliGRAM(s) SubCutaneous every 24 hours  hydrochlorothiazide 25 milliGRAM(s) Oral daily  levETIRAcetam 1000 milliGRAM(s) Oral two times a day  lisinopril 20 milliGRAM(s) Oral daily  risperiDONE   Tablet 2 milliGRAM(s) Oral daily  venlafaxine XR. 75 milliGRAM(s) Oral daily    MEDICATIONS  (PRN):  acetaminophen     Tablet .. 650 milliGRAM(s) Oral every 6 hours PRN Temp greater or equal to 38C (100.4F), Mild Pain (1 - 3)  aluminum hydroxide/magnesium hydroxide/simethicone Suspension 30 milliLiter(s) Oral every 4 hours PRN Dyspepsia  buprenorphine 2 mG/naloxone 0.5 mG SL  Tablet 2 Tablet(s) SubLingual every 4 hours PRN withdrawal sxs  hydrOXYzine hydrochloride 25 milliGRAM(s) Oral daily PRN Anxiety  loperamide 2 milliGRAM(s) Oral three times a day PRN Diarrhea  LORazepam   Injectable 2 milliGRAM(s) IV Push once PRN seizure  prochlorperazine   Tablet 5 milliGRAM(s) Oral every 8 hours PRN nausea      Vital Signs Last 24 Hrs  T(C): 37.3 (01 Aug 2024 05:17), Max: 37.7 (31 Jul 2024 20:30)  T(F): 99.1 (01 Aug 2024 05:17), Max: 99.9 (31 Jul 2024 20:30)  HR: 87 (31 Jul 2024 20:30) (87 - 126)  BP: 143/97 (01 Aug 2024 05:17) (97/77 - 148/83)  BP(mean): --  RR: 18 (01 Aug 2024 05:17) (18 - 18)  SpO2: 100% (01 Aug 2024 05:17) (96% - 100%)    Parameters below as of 01 Aug 2024 05:17  Patient On (Oxygen Delivery Method): room air        PHYSICAL EXAM:    Constitutional: NAD, well-groomed, well-developed  HEENT: PERRLA, EOMI, Normal Hearing,   Neck: No LAD, No JVD  Back: Normal spine flexure, No CVA tenderness  Respiratory: CTAB/L  Cardiovascular: S1 and S2, RRR, no M/G/R  Gastrointestinal: BS+, soft, NT/ND  Extremities: No peripheral edema  Neurological: A/O x 3, no focal deficits    LABS:                        14.8   13.10 )-----------( 227      ( 01 Aug 2024 06:28 )             46.3     08-01    138  |  94<L>  |  18  ----------------------------<  165<H>  3.0<L>   |  28  |  0.8    Ca    9.3      01 Aug 2024 06:28  Mg     2.1     08-01        Urinalysis Basic - ( 01 Aug 2024 06:28 )    Color: x / Appearance: x / SG: x / pH: x  Gluc: 165 mg/dL / Ketone: x  / Bili: x / Urobili: x   Blood: x / Protein: x / Nitrite: x   Leuk Esterase: x / RBC: x / WBC x   Sq Epi: x / Non Sq Epi: x / Bacteria: x      Drug Screen Urine:  Alcohol Level        RADIOLOGY & ADDITIONAL STUDIES:

## 2024-08-01 NOTE — PROGRESS NOTE ADULT - ASSESSMENT
Patient is a 53 year old female w/ pmhx of HTN, HLD, Seizure disorder, CORY/Depression, breast cancer s/p right mastectomy, Opioid abuse presenting w/ f n/v/d, tremors, anxiety, chills who was admitted for opioid withdrawal.    #Opiate withdrawal   - Compazine 5mg prn for nausea  - Hydroxyzine 25mg QD prn for anxiety   - Loperamide 2mg Q8 prn for diarrhea   - Addiction eval appreciated -  initiated  Suboxone - dosing per CATCH team   - Urine Drug screen     #Uncontrolled blood pressure   #H/O HTN   - c/w Lisinopril 20mg QD and HCTZ 25mg QD from home meds  - amlodipine 5 mg qd     #H/O HLD   c/w Lipitor 10mg QD    #H/O CORY/Depression  c/w risperidone 3mg QD and Venlafaxine ER 75mg QD    #H/O Seizure disorder   c/w Keppra 1000mg BID  Seizure precaution   Ativan 2mg IV Push prn    Prophylaxis for GI/DVT  Patient is a 53 year old female with past medical history of HTN, HLD, Seizure disorder, CORY/Depression, breast cancer s/p right mastectomy, and opioid abuse who was admitted for opioid withdrawal.    #Opiate withdrawal   - Compazine 5mg prn for nausea  - Hydroxyzine 25mg QD prn for anxiety   - Loperamide 2mg Q8 prn for diarrhea   - Addiction eval appreciated -  initiated  Suboxone - dosing per CATCH team   - Urine Drug screen     #Uncontrolled blood pressure   #H/O HTN   - c/w Lisinopril 20mg QD and HCTZ 25mg QD from home meds  - amlodipine 5 mg qd     #H/O HLD   c/w Lipitor 10mg QD    #H/O CORY/Depression  c/w risperidone 3mg QD and Venlafaxine ER 75mg QD    #H/O Seizure disorder   c/w Keppra 1000mg BID  Seizure precaution   Ativan 2mg IV Push prn    Prophylaxis for GI/DVT  Patient is a 53 year old female with past medical history of HTN, HLD, Seizure disorder, CORY/Depression, breast cancer s/p right mastectomy, and opioid abuse who was admitted for opioid withdrawal.    #Opiate withdrawal   - Compazine 5mg prn for nausea  - Hydroxyzine 25mg QD prn for anxiety   - Loperamide 2mg Q8 prn for diarrhea   - Addiction eval appreciated -  initiated  Suboxone - dosing per CATCH team   - medically stable for dc     # Hypokalemia 3.0  likely from WD from opiates- v/d and also on thiazide  - will replace     #Uncontrolled blood pressure   #H/O HTN   - c/w Lisinopril 20mg QD and HCTZ 25mg QD from home meds  - amlodipine 5 mg qd     #H/O HLD   c/w Lipitor 10mg QD    #H/O CORY/Depression  c/w risperidone 3mg QD and Venlafaxine ER 75mg QD    #H/O Seizure disorder   c/w Keppra 1000mg BID  Seizure precaution   Ativan 2mg IV Push prn    Prophylaxis for GI/DVT

## 2024-08-01 NOTE — DISCHARGE NOTE PROVIDER - NSDCMRMEDTOKEN_GEN_ALL_CORE_FT
Keppra 1000 mg oral tablet: 1 tab(s) orally every 12 hours  lisinopril-hydrochlorothiazide 20 mg-25 mg oral tablet: 1 tab(s) orally once a day  RisperDAL 2 mg oral tablet: 1 tab(s) orally once a day  venlafaxine 75 mg oral capsule, extended release: 1 cap(s) orally once a day   amLODIPine 5 mg oral tablet: 1 tab(s) orally once a day  Keppra 1000 mg oral tablet: 1 tab(s) orally every 12 hours  lisinopril-hydrochlorothiazide 20 mg-25 mg oral tablet: 1 tab(s) orally once a day  ondansetron 8 mg oral tablet: 1 tab(s) orally 3 times a day as needed for  nausea  RisperDAL 2 mg oral tablet: 1 tab(s) orally once a day  Suboxone 8 mg-2 mg sublingual film: 1 film(s) sublingually 3 times a day MDD 3 films  venlafaxine 75 mg oral capsule, extended release: 1 cap(s) orally once a day

## 2024-08-01 NOTE — DISCHARGE NOTE NURSING/CASE MANAGEMENT/SOCIAL WORK - NSDCPEFALRISK_GEN_ALL_CORE
For information on Fall & Injury Prevention, visit: [email or web address] OR  [email or web address] OR  https://www.cdc.gov/steadi/patient.html

## 2024-08-01 NOTE — PROGRESS NOTE ADULT - PROBLEM SELECTOR PLAN 1
After evaluation at this time will increase dose by 4mg today and monitor. Total dose 20mg. . Pts concerns addressed  Pt will be monitored and supportive care provided.  No other changes to medical care plan for withdrawals.  Monitor labs/electrolytes as needed.    -Counseling provided   CATCH team involved for aftercare and pt will follow up with aftercare to outpatient.
After evaluation at this time increased suboxone to 24mg daily. Pts concerns addressed  Pt will be monitored and supportive care provided.  No other changes to medical care plan for withdrawals.  Monitor labs/electrolytes as needed.    -Counseling provided     CATCH team involved for aftercare and pt will follow up with aftercare. jose alfredo Apodaca.

## 2024-08-01 NOTE — DISCHARGE NOTE NURSING/CASE MANAGEMENT/SOCIAL WORK - PATIENT PORTAL LINK FT
You can access the FollowMyHealth Patient Portal offered by [facility] by registering at the following website: [email or web address]. By joining "[employer]’s FollowMyHealth portal, you will also be able to view your health information using other applications (apps) compatible with our system.

## 2024-08-01 NOTE — DISCHARGE NOTE PROVIDER - HOSPITAL COURSE
52yo female w/ pmhx of HTN, HLD, Seizure disorder, CORY/Depression, breast cancer s/p right mastectomy, Opioid abuse presenting w/ f n/v/d, tremors, anxiety, chills being admitted for opioid withdrawal.    #Opiate withdrawal   - s/p Suboxone 8mg/2mg x4, Clonidine .2mg X1 in ED  - changed zofran to compazine given no improvement in symptoms (reglan not advised due to cross react w/ risperdal)   - Hydroxyzine 25mg QD prn for anxiety   - Loperamide 2mg Q8 prn for diarrhea   - Addiction eval appreciated -  initiated  Suboxone - dosing per CATCH team   - Urine Drug screen     #Uncontrolled blood pressure   #H/O HTN   - on Lisinopril 20mg QD and HCTZ 25mg QD at home   - added amlodipine 5 mg qd     #H/O HLD - c/w Lipitor 10mg QD    #H/O CORY/Depression: c/w risperidone 3mg QD and Venlafaxine ER 75mg QD    #H/O Seizure disorder - c/w Keppra 1000mg BID, Seizure precaution Ativan 2mg IV Push prn    dvt/ gi ppx/diet  dispo: acute - possible 24 hrs   handoff: improvement in withdrawal symptoms

## 2024-08-01 NOTE — DISCHARGE NOTE PROVIDER - ATTENDING ATTESTATION STATEMENT
I have personally seen and examined the patient. I have collaborated with and supervised the There are no Wet Read(s) to document.

## 2024-08-01 NOTE — DISCHARGE NOTE PROVIDER - NSDCCPCAREPLAN_GEN_ALL_CORE_FT
PRINCIPAL DISCHARGE DIAGNOSIS  Diagnosis: Opiate addiction  Assessment and Plan of Treatment: you presented with opioid withdrawal   your symptoms improved   you were able to tolerate diet  take zofran as needed for nausea  Suboxone was started and sent to the pharmacy , take as prescribed  Follow up with Addiction Medicine instructions      SECONDARY DISCHARGE DIAGNOSES  Diagnosis: Uncontrolled hypertension  Assessment and Plan of Treatment: continue with home losartan-hydrochlorothiazide   amlodipine was started , take as prescribed  follow up with primary care doctor within 2 weeks of discharge for continued surveillance

## 2024-08-09 DIAGNOSIS — F11.23 OPIOID DEPENDENCE WITH WITHDRAWAL: ICD-10-CM

## 2024-08-09 DIAGNOSIS — Z87.891 PERSONAL HISTORY OF NICOTINE DEPENDENCE: ICD-10-CM

## 2024-08-09 DIAGNOSIS — G40.909 EPILEPSY, UNSPECIFIED, NOT INTRACTABLE, WITHOUT STATUS EPILEPTICUS: ICD-10-CM

## 2024-08-09 DIAGNOSIS — F32.A DEPRESSION, UNSPECIFIED: ICD-10-CM

## 2024-08-09 DIAGNOSIS — Z90.11 ACQUIRED ABSENCE OF RIGHT BREAST AND NIPPLE: ICD-10-CM

## 2024-08-09 DIAGNOSIS — Z88.6 ALLERGY STATUS TO ANALGESIC AGENT: ICD-10-CM

## 2024-08-09 DIAGNOSIS — Z85.3 PERSONAL HISTORY OF MALIGNANT NEOPLASM OF BREAST: ICD-10-CM

## 2024-08-09 DIAGNOSIS — F41.1 GENERALIZED ANXIETY DISORDER: ICD-10-CM

## 2024-08-09 DIAGNOSIS — I10 ESSENTIAL (PRIMARY) HYPERTENSION: ICD-10-CM

## 2024-08-09 DIAGNOSIS — E78.5 HYPERLIPIDEMIA, UNSPECIFIED: ICD-10-CM

## 2024-08-09 DIAGNOSIS — E87.6 HYPOKALEMIA: ICD-10-CM

## 2025-02-27 NOTE — ED PROVIDER NOTE - CARE PLAN
-------------------------------------------------------------------------------------  [facility], [place]   Psychiatric Progress Note  Hospital Day #5  Date of Service: 02/27/2025    Interval History:  The patient's care was discussed with the treatment team and chart notes were reviewed.    Sleep: 1 hours (02/27/25 0622)  PRN medications:   Last 24H PRN:     acetaminophen (TYLENOL) tablet 975 mg, 975 mg at 02/27/25 0840    alum & mag hydroxide-simethicone (MAALOX) suspension 30 mL, 30 mL at 02/27/25 0157    hydrOXYzine HCl (ATARAX) tablet 25 mg, 25 mg at 02/27/25 0426    haloperidol (HALDOL) tablet 5 mg **AND** LORazepam (ATIVAN) tablet 2 mg, 2 mg at 02/26/25 1040 **AND** diphenhydrAMINE (BENADRYL) capsule 50 mg    melatonin tablet 3 mg, 3 mg at 02/26/25 2007  Staff Report:   Radha noted sleeping for 1 hour tonight. She  was up most of the night frequently seeking out staff.  She reported to the writer that their was water on her floor in her room. Writer went with her to the room and  noted  that Radha had spill water on the floor and was trying  to clean it up with her bed linen. RN cleaned cup the water off the floor and  noted that Radha also had 2 more cups filled with water in her room.  Both  cups were emptied and  thrown in the trash with Radha agreement.    Patient Interview:   Notably is still very tangential and disorganized, so interview summarized below to best of ability of writer.     Patient was seen in the lounge having showered, agreed to meet and walked to room while enthusiastically detailing how refreshed she feels.  Does acknowledge difficulty yesterday with feeling that staff were targetting her by giving wrong information, feeling bad energy from other patients. Today has been feeling calm, more open, states she has bonded with one patient in particular that she poured water on yesterday as he wasn't mad at her and has spent a lot of time talking with him  "today. When asked if she feels safe on the unit, states \"mmm... people are still earning my trust but we're getting there.\" Notes distrust of medical system after inpatient stay, noting that the \"fake doctor\" there misdiagnosed her with bipolar disorder and prescribed her a medication that then had the side effect of sending her into a depressive episode that she barely survived requiring ECT. States outpatient psychiatrist was lazy, never took the time to get to know her, didn't feel she needed the medications and felt she didn't take time to look at alternative diagnoses. Has struggled with \"these kind of episodes, which is insomnia with psychosis, NOT lauren\" for majority of her life and recalls how her brother with bipolar was diagnosed wrong with \"schizoaffective something\" when he was a teenager which similarly led him to need medications that he didn't need. Describes relationship with three brothers. When asked about insomnia with psychosis, shares how she will have periods where she won't sleep for several days and that, with enough sleep deprivation, will experience psychosis and become paranoid, struggle with distinguishing reality. Notes her INFJ identity as not bieng a diagnosis, and how she doesn't have auditory hallucinations but hears her own voice as the \"strong side\" and \"weak side\" that occupy her mind all day. When asked about racing thoughts, she denies them being racing and states this is not accurate (noting again she does not have bipolar which is a symptom that people ask about) however describes it as \"hamsters running on a wheel.\" Is able to acknowledge that thoughts move rapidly. When asked if she felt that she was experiencing this yesterday, she confirms she was but notes sleep here has been more an issue with how uncomfortable the bed is. Egg crate mattress helps,but thinks bed is very uncomfortable and causes a lot of pain. Shares how if she could go home and sleep with her  and " "be with her cat, she would sleep better. Writer notes that  collateral made it sound like she wasn't sleeping well at home either, which she attributes to her cat being older and worried that he won't live longer so was staying up later (becoming tearful at this). Anticipates that she has only slept \"a few hours\" over the last several days, responding to provider concern about this with \"I know I need to sleep 8-10 hours, I know, but I think it's okay if I don't sometimes. People change!\"    Willing to discuss medications, denies concern. States that \"whatever the little green pill was\" last night helped her sleep, trilleptal is tolerated well without side effects. Writer asks to discuss medication changes to help with psychosis and sleep, which she is agreeable to. Writer hands her an informational sheet on Haldol which patient accepts openly, smiling as she reads. When getting to clinical indications and reads the words \"bipolar disorder\" out loud, patient becomes suddenly very tense and angry and throws sheet as well as her glasses to the side. Suspects writer of tricking her, however receptive to education that Haldol is used for multiple times of treatments including bipolar disorder after a few attempts at explanation. Radha asks to see writer with glasses and mask to \"see if she can look into your eyes and see if you're lying to me\", but becomes more relaxed when this is done. Notes that she does want to improve psychosis and sleep, but \"I will NEVER agree to having bipolar and I will NOT take medications for it!\" Does however note she will take medications for psychosis and to help her sleep and, after some discussion of risks vs benefits of haldol (which she is able to articulate back to writer), agrees to trial dosing. Asks if she can trust writer, noting she hasn't trusted many people here (\"outside of my  and therapist Ginette who I trust 100%\") and highlights whether writer trusts  " "Renu because \"she seems suspicious, her photo is in the center of the board there.\"  When reassured that writer does validate that Dr Sears is very trustworthy as the supervising Psychiatrist of the unit and that Dr. Sears cares for her wellbeing, she asks if she can meet with Dr. Sears to redetermine whether she is trustworthy. Notably asks writer, \"if I find out some revelatory information about her, will you listen? It might blow your mind and be godlike, you might not be prepared.\"      Near end of conversation, notes she's been watching the news all morning when not sleeping and highlights that a celebrity death was noted as happening due to carbon monoxide poisoning, but looks proudly at writer before saying \"I know how he really . Do you want to know? I put it together, I think it's the water... you can tell that they're targetting him, like they've been targetting everyone.\" Does not want to elaborate on this, stating that writer can earn her trust and talk to her in the future to learn more about her theory. Smiles and thanks writer as they leave.     Physical Examination:  /80 (BP Location: Other (Comment), Patient Position: Sitting, Cuff Size: Adult Regular)   Pulse 94   Temp 98.2  F (36.8  C) (Temporal)   Resp 18   Ht 1.651 m (5' 5\")   Wt 113.9 kg (250 lb 15.9 oz)   SpO2 96%   BMI 41.77 kg/m    Weight is 250 lbs 15.9 oz  Body mass index is 41.77 kg/m .    Appearance:  awake, alert, adequately groomed, and neatly groomed post shower.   Attitude:   calm, suspicious but oscillates between more cooperative/open when feeling secure with writer. Becomes rapidly more suspicious, guarded, and hostile when perceiving that writer may be alluding to her having bipolar disorder.   Eye Contact:  staring  Mood:  \"Much better\"  Affect:  intensity is heightened, labile, and reactive, quick to become tearful and then stop crying  Speech:  increased speech latency, pressured speech, and rambling. " "  Psychomotor Behavior:  sitting comfortably without difficulty.  no evidence of tardive dyskinesia, dystonia, or tics  Thought Process:  tangential and circumstantial, subtly more organized today however becomes quickly disorganized when labile. Hypervigilant and paranoid, becoming hyper focused when perceiving threat.   Thought Content:  no evidence of suicidal ideation or homicidal ideation; denies AH but does hear \"her own voice as the strong side and weak side\" which appears more consistent with racing thoughts per description. Paranoid towards intent of others, some magical/conspiratorial thinking noted as well as grandiosity of self/cognitive abilities compared to others. Does not appear to be responding to internal stimuli.   Insight:  poor due to ongoing psychosis, lauren. Continues to minimize and defend all mental health symptoms and history despite evidence of the contrary from multiple sources,  influenced strongly by current paranoias.   Judgment:  limited but able to recognize benefit of staying for treatment when provided reassurance and clinical evidence, does understand and recognize need to stay hospitalized to change medications to assist with goals of discharging and not having to return to the hospital at a later point  Oriented to:  time, person, and place  Attention Span and Concentration:  fair  Recent and Remote Memory:  intact  Language:  English with appropriate syntax and vocabulary  Fund of Knowledge: appropriate  Muscle Strength and Tone: normal  Gait and Station: Normal    Liver/kidney function Metabolic CBC   Recent Labs   Lab Test 02/20/25  1436 01/03/24  1540 01/02/24  1633   CR 0.91 0.77 0.82   AST  --  27 32   ALT  --  24 28   ALKPHOS  --  59 79    Recent Labs   Lab Test 02/23/25  1245 12/31/23  1031 02/23/21  0739 02/22/21  1403   CHOL  --   --  212*  --    TRIG  --  122 114  --    LDL  --   --  123*  --    HDL  --   --  66  --    A1C 5.8*  --   --   --    TSH  --   --   --  1.36 " "   Recent Labs   Lab Test 02/20/25  1436   WBC 11.5*   HGB 15.8*   HCT 45.4   MCV 92             Lab results in the last 24 hours:  No results found for this or any previous visit (from the past 24 hours).      Assessment:  Diagnoses:  # Bipolar disorder, current episode manic, with psychotic features  # Anxiety, unspecified   # R/o cannabis use disorder    Radha is a 57 year old female with a history of bipolar disorder with psychotic features - with medical history of CAD, NSTEMI (2021 with 70% stenosis), HLD, and high BMI -  who was admitted on 2/23/25 with concerns of lauren and psychosis in the context of not believing in her diagnosis and titrating off her psych medications ~1 year ago.  Last hospitalized in 2021 under similar circumstances requiring 35mg Zyprexa for stabilization, chart review indicates several depressive episodes since that time that required ECT.  Predominant symptoms this admission included mood lability with frequent tearfulness, sleep issues, hyperverbal pressured speech, and disorganization with references to paranoia and AVH in ED(which was later noted on inpatient unit on 2/26) . She demonstrates poor insight into her symptoms both presently and when at baseline, largely minimizing her symptoms/experiences with belief that she does not meet criteria given that her brother's bipolar is significantly worse - her  and prior providers have attempted to challenge and provide education around this, however this does not appear to have been successful. Substance use does appear to be contributing given patient does appear to have recently returned to smoking in the last two months and has been continuing to use marijuana 3-4 times a week to reduce stress.    Per available chart review and collateral reports (OP psychiatry notes unavailable), lauren was historically stabilized with 35mg Zyprexa and depression was considered \"treatment resistant\" per  requiring ECT. At some " "point between [id number] she was transitioned to Latuda 40mg BID and Lexapro 10mg, however appears to have titrated off all medications by mid 2023. She historically saw Dr. Nay Torres through [facility] and has an individual therapist. Her  and therapist appears to be her primary social support.     Diagnostically, patient presentation appears consistent with Bipolar Disorder, current episode manic, with psychotic features. Decompensation appears to be secondary to being off psychotropic medications, may also have been intensified with ongoing cannabis use. When admitted to station 12 voluntarily , patient was initially cooperative to treatment and agreed to start Trilleptal on 2/23 after refusing [place] (\"I heard bad things about it\") and Depakote (concern for weight gain). Patient later appeared to decompesate further on the evening of 2/25 and demonstrated evidence of responding to internal stimuli, worsening disorganization, and hyperfixation on peers (seeing them as \"good\"/\"bad\",  writing secret letters, banging head on wall when distressed by behaviors, spilling water on another patient due to feeling they were \"an energy vampire\" that couldn't be trusted).  On 2/26, became more verbally aggressive and demonstrated new mistrust of unit/treatment staff, referencing medications being \"poison\" and saying that she will  \"never be taking medication from anyone ever again\" so no medication changes were made. Today on 2/27 appears more open, however becomes quickly guarded and paranoid with any perceived reference to her having bipolar disorder and refusal to allow this diagnosis. Does however recognize that she has symptoms of psychosis and poor sleep and here demonstrates capacity insofar as recognizing treatment for those two symptoms. As she is labile and quick to consider discontinuing treatment and rejecting medical care when perceiving even subtle allusions to her diagnosis (which is clinically " "supported), will defer from further education until she is better stabilized. Was agreeable and able to explain risks vs benefits of treatment with teaching for increasing Trilleptal and starting Haldol today.      Initially planned for transfer to another unit due to being low acuity, however does presently at this time with increasing behaviors meet ongoing criteria for station 12 admission. If patient does demonstrate worsening imminent risk to self/others and demonstrates inability to consent for treatment (which will be more apparent tomorrow depending on how she responds to new orders for medications today), may need to consider placing the patient on a hold and petitioning for commitment if she meets criteria.       Clinically Significant Risk Factors                              # Severe Obesity: Estimated body mass index is 41.77 kg/m  as calculated from the following:    Height as of this encounter: 1.651 m (5' 5\").    Weight as of this encounter: 113.9 kg (250 lb 15.9 oz).             Plan:    Changes today:  -Start Haldol 5mg, 10mg at bedtime  -Increase Trilleptal to 300mg BID     Medications:  -Haldol 5mg, 10mg at bedtime (new, first dose tonight)  -Trilleptal 300mg BID (first dose tonight)    PRN:  -B52 oral/IM available q8h for agitation/aggression  -Hydroxyzine 25mg q4h for anxiety (first line)  -Ativan 1mg q4h for anxiety (second line), insomnia  Patient will be treated in therapeutic milieu with appropriate individual and group therapies as described.    Other:  -Discuss cannabis use when patient is more stabilized    Medical diagnoses to be addressed this admission:      #CAD w/ history of NSTEMI 2021 (70% stenosis)  #HTN  #HLD  #Prediabetes   #Obesity, BMI >40  Follows with PCP, cardiology on outpatient basis.  confirms that patient has been compliant with medications at home. A1C 5.8% this admission (high end of normal to early prediabetes), lipids 07/2024 unremarkable outside of slight " "elevation in triglycerides. Patient has lost 30lbs while dieting with her  in last year with motivation to improve fitness.  -Atenalol 25mg  -Atorvastatin 50mg  -ASA 81mg    Consults:   None at this time    Psychiatric Hospital course:   Radha James was admitted to Station 12 on 2/22/2025 after presenting to the ED with lauren. Her PTA medications were continued however psychiatric medications are unclear at this time.  -2/23: Started on oxcarbazepine 150mg BID  -2/24: No medication changes. Evidence of ongoing lauren, engaged in treatment, although remains hyperverbal and intrusive to others with poor boundaries in [facility]. Deferred medication changes to provide time to get more collateral from , OP provider due to unclear prior PTA regimen (patient agreeable to this).   -2/25: MD notified because  brought Pepto-Bismol to patient from outside the hospital and given it to her, which was later discovered after she had stuffed pills in crevice on chair. Firm education and clarification of unit policies provided to  and patient, visitation rights restricted to main lobby only. No evidence of further medications being given.   -2/26: Continuing to only sleep 2-3 hours. Notably more preoccupied, disorganized, and labile today compared to prior. Appears to be more agitated and labile towards others, responding to internal stimuli, and hyperfixating on peers (seeing them as \"good\"/\"bad\",  writing secret letters, banging head on wall when distressed by behaviors, spilling water on another patient due to feeling they were \"an energy vampire\" that couldn't be trusted).  On 2/26, became more verbally aggressive and demonstrated new mistrust of unit/treatment staff, referencing medications being \"poison\" and saying that she will  \"never be taking medication from anyone ever again\". Treatment team had planned to discuss starting Haldol and increasing Trilleptal with her today, however adamantly " refused to meet and discuss options.   -2/27: Continues to demonstrate symptoms of lauren and psychosis, 1 hr of sleep overnight. Adamantly refusing bipolar disorder diagnosis but does have insight into  insomnia and psychosis, so agreeable to start Haldol 5mg daily + 10mg at bedtime and increase Trilleptal to 300mg.     Legal Status:   Orders Placed This Encounter      Voluntary      Disposition: TBD, pending stabilization & development of a safe discharge plan.       Safety Assessment:   Behavioral Orders   Procedures    Code 1 - Restrict to Unit    Routine Programming     As clinically indicated    Status 15     Every 15 minutes.       Current Facility-Administered Medications   Medication Dose Route Frequency Provider Last Rate Last Admin    aspirin (ASA) chewable tablet 81 mg  81 mg Oral Daily Keyshawn Early MD   81 mg at 02/27/25 0840    atenolol (TENORMIN) tablet 25 mg  25 mg Oral Daily Buzz Sanders APRN CNP   25 mg at 02/27/25 0841    atorvastatin (LIPITOR) tablet 40 mg  40 mg Oral Daily Buzz Sanders APRN CNP   40 mg at 02/27/25 0841    OXcarbazepine (TRILEPTAL) tablet 150 mg  150 mg Oral BID Keyshawn Early MD   150 mg at 02/27/25 0841     Current Facility-Administered Medications   Medication Dose Route Frequency Provider Last Rate Last Admin    acetaminophen (TYLENOL) tablet 975 mg  975 mg Oral Q6H PRN Lubna Jasmine MD   975 mg at 02/27/25 0840    alum & mag hydroxide-simethicone (MAALOX) suspension 30 mL  30 mL Oral Q4H PRN Buzz Sanders APRN CNP   30 mL at 02/27/25 0157    haloperidol (HALDOL) tablet 5 mg  5 mg Oral Q8H PRN Paris Ross MD        And    LORazepam (ATIVAN) tablet 2 mg  2 mg Oral Q8H PRN Paris Ross MD   2 mg at 02/26/25 1040    And    diphenhydrAMINE (BENADRYL) capsule 50 mg  50 mg Oral Q8H PRN Paris Ross MD        haloperidol lactate (HALDOL) injection 5 mg  5 mg Intramuscular Q8H PRN Paris Ross MD        And    LORazepam (ATIVAN)  injection 2 mg  2 mg Intramuscular Q8H PRN Paris Ross MD        And    diphenhydrAMINE (BENADRYL) injection 50 mg  50 mg Intramuscular Q8H PRN Paris Ross MD        hydrOXYzine HCl (ATARAX) tablet 25 mg  25 mg Oral Q4H PRN Buzz Sanders APRN CNP   25 mg at 02/27/25 0426    LORazepam (ATIVAN) tablet 1 mg  1 mg Oral Q4H PRN Keyshawn Early MD   1 mg at 02/25/25 0041    melatonin tablet 3 mg  3 mg Oral At Bedtime PRN Buzz Sanders APRN CNP   3 mg at 02/26/25 2007    nicotine (NICORETTE) gum 2 mg  2 mg Buccal Q1H PRN Paris Ross MD        senna-docusate (SENOKOT-S/PERICOLACE) 8.6-50 MG per tablet 1 tablet  1 tablet Oral BID PRN Buzz Sanders APRN CNP           Allergies   Allergen Reactions    Zofran [Ondansetron] Nausea and Vomiting     Self reported     Patient seen and staffed with my attending physician    Paris Ross MD  Psychiatry Resident, PGY2    02/27/2025    Principal Discharge DX:	Opiate dependence   1